# Patient Record
Sex: MALE | Race: BLACK OR AFRICAN AMERICAN | Employment: OTHER | URBAN - METROPOLITAN AREA
[De-identification: names, ages, dates, MRNs, and addresses within clinical notes are randomized per-mention and may not be internally consistent; named-entity substitution may affect disease eponyms.]

---

## 2018-02-06 ENCOUNTER — OFFICE VISIT (OUTPATIENT)
Dept: VASCULAR SURGERY | Age: 78
End: 2018-02-06

## 2018-02-06 VITALS
HEIGHT: 66 IN | SYSTOLIC BLOOD PRESSURE: 124 MMHG | WEIGHT: 180 LBS | DIASTOLIC BLOOD PRESSURE: 70 MMHG | RESPIRATION RATE: 18 BRPM | HEART RATE: 70 BPM | BODY MASS INDEX: 28.93 KG/M2

## 2018-02-06 DIAGNOSIS — M79.604 PAIN IN BOTH LOWER EXTREMITIES: ICD-10-CM

## 2018-02-06 DIAGNOSIS — R09.89 BRUIT OF LEFT CAROTID ARTERY: ICD-10-CM

## 2018-02-06 DIAGNOSIS — M79.605 PAIN IN BOTH LOWER EXTREMITIES: ICD-10-CM

## 2018-02-06 DIAGNOSIS — Z86.718 HISTORY OF DVT (DEEP VEIN THROMBOSIS): ICD-10-CM

## 2018-02-06 DIAGNOSIS — Z86.73 HISTORY OF CVA (CEREBROVASCULAR ACCIDENT): Primary | ICD-10-CM

## 2018-02-06 RX ORDER — GLUCOSAMINE SULFATE 1500 MG
POWDER IN PACKET (EA) ORAL DAILY
COMMUNITY
End: 2019-07-23

## 2018-02-06 RX ORDER — SPIRONOLACTONE 25 MG/1
TABLET ORAL DAILY
COMMUNITY
End: 2019-07-23

## 2018-02-06 RX ORDER — LOSARTAN POTASSIUM 100 MG/1
100 TABLET ORAL DAILY
COMMUNITY

## 2018-02-06 RX ORDER — CARVEDILOL 25 MG/1
25 TABLET ORAL 2 TIMES DAILY WITH MEALS
COMMUNITY
End: 2019-07-23

## 2018-02-06 RX ORDER — HYDROCODONE BITARTRATE AND ACETAMINOPHEN 5; 325 MG/1; MG/1
TABLET ORAL
COMMUNITY
End: 2019-07-23

## 2018-02-06 RX ORDER — ATORVASTATIN CALCIUM 20 MG/1
TABLET, FILM COATED ORAL DAILY
COMMUNITY

## 2018-02-06 RX ORDER — HYDROCHLOROTHIAZIDE 12.5 MG/1
12.5 CAPSULE ORAL DAILY
COMMUNITY

## 2018-02-06 NOTE — MR AVS SNAPSHOT
[facility] 
 
 
 [facility] [address] 
[phone] Patient: Frieda Vila MRN: [id number] Visit Information Date & Time Provider Department Dept. Phone Encounter #  
 2018 10:30 AM Adrian Austin MD [facility] [address] [id number] Your Appointments 2018 10:45 AM  
Follow Up with Adrian Austin MD  
BS Vein/Vascular Spec THE [facility] ([address]) Appt Note: 2 week FU with studies [facility] [address] [facility] [address] [facility] [address] [facility]  
  
   
 [facility]  Upcoming Health Maintenance Date Due DTaP/Tdap/Td series (1 - Tdap) 1961 ZOSTER VACCINE AGE 60> 1999 GLAUCOMA SCREENING Q2Y 2004 Pneumococcal 65+ Low/Medium Risk (1 of 2 - PCV13) 2004 MEDICARE YEARLY EXAM 2004 Influenza Age 5 to Adult 2017 Allergies as of 2018  Review Complete On: 2018 By: Ever Jones RN Severity Noted Reaction Type Reactions Fentanyl  2012    Unknown (comments) Penicillins  2012    Unknown (comments) Current Immunizations  Never Reviewed No immunizations on file. Not reviewed this visit You Were Diagnosed With   
  
 Codes Comments History of CVA (cerebrovascular accident)    -  Primary ICD-10-CM: Z86.73 
ICD-9-CM: V12.54 History of DVT (deep vein thrombosis)     ICD-10-CM: Y66.074 ICD-9-CM: V12.51 Bruit of left carotid artery     ICD-10-CM: R09.89 ICD-9-CM: 801. 9 Vitals BP Pulse Resp Height(growth percentile) Weight(growth percentile) BMI  
 124/70 (BP 1 Location: Left arm, BP Patient Position: Sitting) 70 18 5' 6\" (1.676 m) 180 lb (81.6 kg) 29.05 kg/m2 Smoking Status Former Smoker BMI and BSA Data Body Mass Index Body Surface Area 29.05 kg/m 2 1.95 m 2 Preferred Pharmacy Pharmacy Name Phone Harlan Vu [facility], [address]  [phone] Your Updated Medication List  
  
   
This list is accurate as of: 2/6/18 11:33 AM.  Always use your most recent med list.  
  
  
  
  
 ACIPHEX 20 mg tablet Generic drug:  RABEprazole Take 20 mg by mouth daily. aspirin 81 mg chewable tablet Take 81 mg by mouth daily. atorvastatin 20 mg tablet Commonly known as:  LIPITOR Take  by mouth daily. carvedilol 25 mg tablet Commonly known as:  Manus Tiera Take 25 mg by mouth two (2) times daily (with meals). DILANTIN EXTENDED 100 mg ER capsule Generic drug:  phenytoin ER Take  by mouth. hydroCHLOROthiazide 12.5 mg capsule Commonly known as:  Lorrie Shutters Take 12.5 mg by mouth daily. HYDROcodone-acetaminophen 5-325 mg per tablet Commonly known as:  Juan Fraction Take  by mouth.  
  
 lisinopril 40 mg tablet Commonly known as:  Martha Oakford Take 40 mg by mouth daily. losartan 100 mg tablet Commonly known as:  COZAAR Take 100 mg by mouth daily. Niaspan 1,000 mg Tb24 tab Generic drug:  niacin Take  by mouth nightly. spironolactone 25 mg tablet Commonly known as:  ALDACTONE Take  by mouth daily. TENORMIN 50 mg tablet Generic drug:  atenolol Take  by mouth daily. VITAMIN D3 1,000 unit Cap Generic drug:  cholecalciferol Take  by mouth daily. * warfarin 7.5 mg tablet Commonly known as:  COUMADIN Take 7.5 mg by mouth daily. * warfarin 5 mg tablet Commonly known as:  COUMADIN Take 5 mg by mouth daily. ZANTAC 150 mg tablet Generic drug:  raNITIdine Take 150 mg by mouth two (2) times a day. ZOCOR 40 mg tablet Generic drug:  simvastatin Take  by mouth nightly. * Notice: This list has 2 medication(s) that are the same as other medications prescribed for you.  Read the directions carefully, and ask your doctor or other care provider to review them with you. To-Do List   
 02/13/2018 9:00 AM  
  Appointment with THE [facility] at THE [facility] [address] [place] [address] ([phone]) No preparation is required for this study. Patient can have their meals and take their medications. Patient should not wear a turtleneck or high neck shirt for this study. Please arrive 30 minutes prior to your scheduled appointment time. 02/13/2018 10:00 AM  
  Appointment with THE [facility] VASC[facility] LAB at THE [facility] [address] [place] [address] ([phone]) Please have patient bring a copy of their order if same day add-on. It can also be faxed to [facility] - [phone]. There are no restrictions for this study. The patient can eat breakfast and take their medications. 02/21/2018 Imaging:  DUPLEX CAROTID BILATERAL   
  
 02/21/2018 Imaging:  DUPLEX UPPER EXT VENOUS BILAT Please provide this summary of care documentation to your next provider. Your primary care clinician is listed as Narciso Ahmadi. If you have any questions after today's visit, please call [phone].

## 2018-02-07 NOTE — PROGRESS NOTES
Malena Paul    Chief Complaint   Patient presents with    Leg Pain    Blood Clot       History and Physical    Mr. Romain Trejo is a 65year old male who was referred to our office for evaluation of his vascular disease and recent history of bilateral leg pain right worse than left. Mr. Romain Trejo has a history of a CVA several years prior which has left him with some residual right sided weakness and some expressive aphasia. During the time he was hospitalized for his stroke he developed an extensive bilateral DVT and had an IVC filter placed. He is currently on chronic anticoagulation. Recently, he has developed pain in his upper thighs and legs. He states that this pain is worsened by laying on his side at night and when he walks. Mr. Romain Trejo states that the pain he develops in his legs with walking occurs as soon as he starts walking and is not associated with distances. He denies any wounds or ulcers or excessive swelling. He is a former smoker. Past Medical History:   Diagnosis Date    Burning with urination     Elevated PSA 3/14/2012    Hypercholesterolemia     Hypertension     Peripheral vascular disease ([facility]     Stroke ([facility]     Thromboembolus [facility])      Patient Active Problem List   Diagnosis Code    Elevated PSA R97.20    History of CVA (cerebrovascular accident) Z80.78    History of DVT (deep vein thrombosis) Z86.718    Bruit of left carotid artery R09.89    Pain in both lower extremities M79.604, M79.605     Past Surgical History:   Procedure Laterality Date    HX COLONOSCOPY      HX PROSTATECTOMY      HX UROLOGICAL      IR STENT PLACEMENT  2002    VASCULAR SURGERY PROCEDURE UNLIST       Current Outpatient Prescriptions   Medication Sig Dispense Refill    HYDROcodone-acetaminophen (NORCO) 5-325 mg per tablet Take  by mouth.  atorvastatin (LIPITOR) 20 mg tablet Take  by mouth daily.  losartan (COZAAR) 100 mg tablet Take 100 mg by mouth daily.       carvedilol (COREG) 25 mg tablet Take 25 mg by mouth two (2) times daily (with meals).  cholecalciferol (VITAMIN D3) 1,000 unit cap Take  by mouth daily.  hydroCHLOROthiazide (MICROZIDE) 12.5 mg capsule Take 12.5 mg by mouth daily.  spironolactone (ALDACTONE) 25 mg tablet Take  by mouth daily.  warfarin (COUMADIN) 7.5 mg tablet Take 7.5 mg by mouth daily.  warfarin (COUMADIN) 5 mg tablet Take 5 mg by mouth daily.  aspirin 81 mg chewable tablet Take 81 mg by mouth daily.  phenytoin ER (DILANTIN EXTENDED) 100 mg ER capsule Take  by mouth.  RABEprazole (ACIPHEX) 20 mg tablet Take 20 mg by mouth daily.  ranitidine (ZANTAC) 150 mg tablet Take 150 mg by mouth two (2) times a day.  niacin (NIASPAN) 1,000 mg Tb24 tab Take  by mouth nightly.  simvastatin (ZOCOR) 40 mg tablet Take  by mouth nightly.  atenolol (TENORMIN) 50 mg tablet Take  by mouth daily.  lisinopril (PRINIVIL, ZESTRIL) 40 mg tablet Take 40 mg by mouth daily. Allergies   Allergen Reactions    Fentanyl Unknown (comments)    Penicillins Unknown (comments)     Social History     Social History    Marital status:      Spouse name: N/A    Number of children: N/A    Years of education: N/A     Occupational History    Not on file. Social History Main Topics    Smoking status: Former Smoker     Types: Cigarettes     Quit date: 2/6/1980    Smokeless tobacco: Never Used    Alcohol use No    Drug use: No    Sexual activity: Not Currently     Other Topics Concern    Not on file     Social History Narrative      History reviewed. No pertinent family history. Review of Systems    Review of Systems   Constitutional: Negative for chills, diaphoresis, fever, malaise/fatigue and weight loss. HENT: Negative for hearing loss and sore throat. Eyes: Negative for blurred vision, photophobia and redness. Respiratory: Negative for cough, hemoptysis, shortness of breath and wheezing. Cardiovascular: Positive for leg swelling. Negative for chest pain, palpitations and orthopnea. Gastrointestinal: Negative for abdominal pain, blood in stool, constipation, diarrhea, heartburn, nausea and vomiting. Genitourinary: Negative for dysuria, frequency, hematuria and urgency. Musculoskeletal: Negative for back pain and myalgias. Skin: Negative for itching and rash. Neurological: Positive for focal weakness. Negative for dizziness, speech change, weakness and headaches. Endo/Heme/Allergies: Does not bruise/bleed easily. Psychiatric/Behavioral: Negative for depression and suicidal ideas. Physical Exam:    Visit Vitals    /70 (BP 1 Location: Left arm, BP Patient Position: Sitting)    Pulse 70    Resp 18    Ht 5' 6\" (1.676 m)    Wt 180 lb (81.6 kg)    BMI 29.05 kg/m2      Physical Examination: General appearance - alert, well appearing, and in no distress  Mental status - alert, oriented to person, place, and time  Eyes - sclera anicteric, left eye normal, right eye normal  Ears - right ear normal, left ear normal  Nose - normal and patent, no erythema, discharge or polyps  Mouth - mucous membranes moist, pharynx normal without lesions  Neck - supple, no significant adenopathy, soft left carotid bruit  Lymphatics - no palpable lymphadenopathy  Chest - clear to auscultation, no wheezes, rales or rhonchi, symmetric air entry  Heart - normal rate and regular rhythm  Abdomen - soft, nontender, nondistended, no masses or organomegaly  Neurological - Contracture of right hand. Right upper and lower extremity 3/5 strength  Extremities - 1+ bilateral edema. + pedal pulses bilaterally      Impression and Plan:    ICD-10-CM ICD-9-CM    1. History of CVA (cerebrovascular accident) Z86.73 V12.54 DUPLEX CAROTID BILATERAL   2. History of DVT (deep vein thrombosis) Z86.718 V12.51 DUPLEX UPPER EXT VENOUS BILAT      DUPLEX LOWER EXT VENOUS BILAT   3.  Bruit of left carotid artery R09.89 785.9 DUPLEX CAROTID BILATERAL   4. Pain in both lower extremities M79.604 729.5 DUPLEX LOWER EXT VENOUS BILAT    M79.605       Orders Placed This Encounter    DUPLEX CAROTID BILATERAL    DUPLEX UPPER EXT VENOUS BILAT    DUPLEX LOWER EXT VENOUS BILAT    HYDROcodone-acetaminophen (NORCO) 5-325 mg per tablet    atorvastatin (LIPITOR) 20 mg tablet    losartan (COZAAR) 100 mg tablet    carvedilol (COREG) 25 mg tablet    cholecalciferol (VITAMIN D3) 1,000 unit cap    hydroCHLOROthiazide (MICROZIDE) 12.5 mg capsule    spironolactone (ALDACTONE) 25 mg tablet     I told Mr. Carolyn Baca that given his pulse exam as well as the description of his pain that his symptoms are not due to arterial disease. It is unlikely related to venous disease as well, but given his history of extensive DVTs I do believe a reflux study is warranted. Ideally Mr. Carolyn Baca would wear compression stockings to see if this can help with his symptoms, but I am not sure if he would be able to put them on with his residual RUE weakness. We will obtain a reflux study to see if he has significant reflux and will then address this compression issue. Finally, we will check his carotid arteries given his history of a stroke and carotid bruit. Follow-up Disposition:  Return for Vascular labs. The treatment plan was reviewed with the patient in detail. The patient voiced understanding of this plan and all questions and concerns were addressed. The patient agrees with this plan. We discussed the signs and symptoms that would require earlier attention or intervention. The patient was given educational material related to his/her visit and the patient has voiced understanding of the material.     I appreciate the opportunity to participate in the care of your patient. I will be sure to keep you informed of any subsequent changes in the treatment plan. If you have any questions or concerns, please feel free to contact me.   Vicenta Haynes, MD    PLEASE NOTE:  This document has been produced using voice recognition software. Unrecognized errors in transcription may be present.

## 2018-02-13 ENCOUNTER — HOSPITAL ENCOUNTER (OUTPATIENT)
Dept: VASCULAR SURGERY | Age: 78
Discharge: HOME | End: 2018-02-13
Attending: SURGERY
Payer: MEDICARE

## 2018-02-13 ENCOUNTER — APPOINTMENT (OUTPATIENT)
Dept: VASCULAR SURGERY | Age: 78
End: 2018-02-13
Attending: SURGERY
Payer: MEDICARE

## 2018-02-13 DIAGNOSIS — M79.605 PAIN IN BOTH LOWER EXTREMITIES: ICD-10-CM

## 2018-02-13 DIAGNOSIS — R09.89 BRUIT OF LEFT CAROTID ARTERY: ICD-10-CM

## 2018-02-13 DIAGNOSIS — M79.604 PAIN IN BOTH LOWER EXTREMITIES: ICD-10-CM

## 2018-02-13 DIAGNOSIS — Z86.718 HISTORY OF DVT (DEEP VEIN THROMBOSIS): ICD-10-CM

## 2018-02-13 DIAGNOSIS — Z86.73 HISTORY OF CVA (CEREBROVASCULAR ACCIDENT): ICD-10-CM

## 2018-02-13 PROCEDURE — 93880 EXTRACRANIAL BILAT STUDY: CPT

## 2018-02-13 PROCEDURE — 93970 EXTREMITY STUDY: CPT

## 2018-02-13 NOTE — PROCEDURES
[facility]  *** FINAL REPORT ***    Name: Yeny Chavez  MRN: [id number]    Outpatient  : 02 Sep 1940  HIS Order #: [id number]  [id number] [facility] Visit #: [id number]  Date: 2018    TYPE OF TEST: Peripheral Venous Testing    REASON FOR TEST  Limb swelling    Right Leg:-  Deep venous thrombosis:           No  Superficial venous thrombosis:    No  Deep venous insufficiency:        Yes  Superficial venous insufficiency: Yes    Left Leg:-  Deep venous thrombosis:           No  Superficial venous thrombosis:    No  Deep venous insufficiency:        Yes  Superficial venous insufficiency: Yes    Abnormal Valve Closure Times (seconds):    Right Common Femoral: 1.3    Right Femoral:        1.5    Right Deep Femoral:   2.0    Right Popliteal:      3.8    Right SFJ:            3.9    Right GSV proximal:   1.4    Right GSV mid:        1.5    Right GSV distal:     2.5    Left Common Femoral:  2.8    Left Femoral:         7.0    Left Deep Femoral:    6.5    Left Popliteal:       7.1    Left SFJ:             4.7    Left GSV proximal:    3.4    Left GSV mid:         2.1    Left SSV:             2.3    Vein Mapping:    Diam.   Depth  (mm)    Right Great Saphenous Vein:    High Thigh:      5.7    Mid Thigh:       5.2    Low Thigh:       5.0    Knee:            5.0    High Calf:       4.7    Low Calf:        4.8    Ankle:           4.0    Right Small Saphenous Vein:    SPJ:    Mid Calf: Ankle:    Giacomini:  Accessory saph.:  Daniel :  Marval Aures :    Left Great Saphenous Vein:    High Thigh:      4.8    Mid Thigh:       4.7    Low Thigh:       4.1    Knee:    High Calf:       5.1    Low Calf:        3.9    Ankle:    Left Small Saphenous Vein:    SPJ:             3.7    Mid Calf: Ankle:    Giacomini:  Accessory saph.:  Daniel :  Fields :      INTERPRETATION/FINDINGS  Duplex and B-mode images were obtained using 2-D gray scale, color  flow, and spectral Doppler analysis. Right leg :  1.  Deep vein(s) visualized include the common femoral, deep femoral,  proximal femoral, mid femoral, distal femoral, popliteal(above knee),  popliteal(fossa) and popliteal(below knee) veins. 2. No evidence of deep venous thrombosis detected in the veins  visualized. 3. Superficial vein(s) visualized include the great saphenous and  small saphenous veins. 4. No evidence of superficial thrombosis detected. 5. Incompetent deep venous system with reflux involving the common  femoral, deep femoral, proximal femoral, mid femoral, distal femoral,  popliteal(above knee) and popliteal(below knee) veins. 6. Incompetent great saphenous vein with reflux in the sapheno-femoral   junction, proximal thigh, mid thigh and distal thigh. 7. Saphenopopliteal junction not seen    Left leg :  1. Deep vein(s) visualized include the common femoral, deep femoral,  proximal femoral, mid femoral, distal femoral, popliteal(above knee),  popliteal(fossa) and popliteal(below knee) veins. 2. No evidence of deep venous thrombosis detected in the veins  visualized. 3. Superficial vein(s) visualized include the great saphenous and  small saphenous veins. 4. No evidence of superficial thrombosis detected. 5. Incompetent deep venous system with reflux involving the common  femoral, deep femoral, proximal femoral, mid femoral, distal femoral,  popliteal(above knee) and popliteal(below knee) veins. 6. Incompetent great saphenous vein with reflux in the sapheno-femoral   junction, proximal thigh and mid thigh. 7. Incompetent small saphenous vein with reflux in the  sapheno-popliteal junction and calf. 8. Mid calf = 2.8mm and reflux 7.9s    ADDITIONAL COMMENTS  Venous insufficiency worksheet is scanned in patients chart    I have personally reviewed the data relevant to the interpretation of  this  study. TECHNOLOGIST: Sima Gaytan  Signed: 02/13/2018 01:00 PM    PHYSICIAN: Amarjit Lima.  Sen Villafuerte MD  Signed: 02/13/2018 03:25 PM

## 2018-02-13 NOTE — PROCEDURES
[facility]  *** FINAL REPORT ***    Name: Shola Rivera  MRN: [id number]    Outpatient  : 02 Sep 1940  HIS Order #: [id number]  [address] [facility] Visit #: [id number]  Date: 2018    TYPE OF TEST: Cerebrovascular Duplex    REASON FOR TEST  Cerebrovascular accident    Right Carotid:-             Proximal               Mid                 Distal  cm/s  Systolic  Diastolic  Systolic  Diastolic  Systolic  Diastolic  CCA:     85.6      14.0      116.0      19.0       90.0      14.0  Bulb:  ECA:    155.0      17.0  ICA:     35.0       8.0       49.0      16.0       82.0      22.0  ICA/CCA:  0.7       1.2    ICA Stenosis: <50%    Right Vertebral:-  Finding: Antegrade  Sys:       40.0  Shala:        8.0    Right Subclavian: Normal    Left Carotid:-            Proximal                Mid                 Distal  cm/s  Systolic  Diastolic  Systolic  Diastolic  Systolic  Diastolic  CCA:    281.5      13.0      108.0      17.0       91.0      14.0  Bulb:  ECA:    103.0       6.0  ICA:     46.0      11.0       61.0      16.0       52.0      13.0  ICA/CCA:  0.4       1.2    ICA Stenosis: <50%    Left Vertebral:-  Finding: Antegrade  Sys:       40.0  Shala:        8.0    Left Subclavian: Normal    INTERPRETATION/FINDINGS  Duplex images were obtained using 2-D gray scale, color flow, and  spectral Doppler analysis. 1. Bilateral <50% stenosis of the internal carotid arteries. 2. No significant stenosis in the external carotid arteries  bilaterally. 3. Antegrade flow in both vertebral arteries. 4. Normal flow in both subclavian arteries. Plaque Morphology:  1. Homogeneous plaque in the bulb and right ICA. 2. Homogeneous plaque in the bulb and left ICA. ADDITIONAL COMMENTS    I have personally reviewed the data relevant to the interpretation of  this  study. TECHNOLOGIST: Lucius Shane  Signed: 2018 11:07 AM    PHYSICIAN: Loc Fall.  Barrie Fu MD  Signed: 2018 03:26 PM

## 2018-02-20 ENCOUNTER — OFFICE VISIT (OUTPATIENT)
Dept: VASCULAR SURGERY | Age: 78
End: 2018-02-20

## 2018-02-20 VITALS
BODY MASS INDEX: 28.93 KG/M2 | WEIGHT: 180 LBS | DIASTOLIC BLOOD PRESSURE: 80 MMHG | HEIGHT: 66 IN | HEART RATE: 68 BPM | RESPIRATION RATE: 18 BRPM | SYSTOLIC BLOOD PRESSURE: 142 MMHG

## 2018-02-20 DIAGNOSIS — M79.604 PAIN IN BOTH LOWER EXTREMITIES: Primary | ICD-10-CM

## 2018-02-20 DIAGNOSIS — M79.605 PAIN IN BOTH LOWER EXTREMITIES: Primary | ICD-10-CM

## 2018-02-20 DIAGNOSIS — M79.89 LEG SWELLING: ICD-10-CM

## 2018-02-20 NOTE — MR AVS SNAPSHOT
[facility] 
 
 
 [address] [place] [address] 
[phone] Patient: Valerie Marie MRN: [id number] ROSITA:[birth year] Visit Information Date & Time Provider Department Dept. Phone Encounter #  
 2/20/2018 10:45 AM CORY Escobedo Vein/Vascular Spec [address] [id number] Your Appointments 3/13/2018  8:15 AM  
HOSPITAL DISCHARGE with Jayla Cowan NP  
BS Vein/Vascular Spec THE [facility] ([facility] SCHEDULING) Appt Note: DC venogram  
 [facility] [address] [facility]  
  
   
 [address] [place] [address] Upcoming Health Maintenance Date Due DTaP/Tdap/Td series (1 - Tdap) 9/2/1960 ZOSTER VACCINE AGE 60> 7/2/1999 GLAUCOMA SCREENING Q2Y 9/2/2004 Pneumococcal 65+ Low/Medium Risk (1 of 2 - PCV13) 9/2/2004 MEDICARE YEARLY EXAM 9/2/2004 Allergies as of 2/20/2018  Review Complete On: 2/20/2018 By: Angeles Bosch RN Severity Noted Reaction Type Reactions Fentanyl  03/14/2012    Unknown (comments) Penicillins  03/14/2012    Unknown (comments) Current Immunizations  Never Reviewed No immunizations on file. Not reviewed this visit You Were Diagnosed With   
  
 Codes Comments Pain in both lower extremities    -  Primary ICD-10-CM: M79.604, M79.605 ICD-9-CM: 729.5 Leg swelling     ICD-10-CM: M79.89 ICD-9-CM: 729.81 Vitals BP Pulse Resp Height(growth percentile) Weight(growth percentile) BMI  
 142/80 (BP 1 Location: Left arm, BP Patient Position: Sitting) 68 18 5' 6\" (1.676 m) 180 lb (81.6 kg) 29.05 kg/m2 Smoking Status Former Smoker BMI and BSA Data Body Mass Index Body Surface Area 29.05 kg/m 2 1.95 m 2 Preferred Pharmacy Pharmacy Name Phone [address] [facility], [address] [place] [address] [phone] Your Updated Medication List  
  
 This list is accurate as of: 2/20/18  2:45 PM.  Always use your most recent med list.  
  
  
  
  
 ACIPHEX 20 mg tablet Generic drug:  RABEprazole Take 20 mg by mouth daily. aspirin 81 mg chewable tablet Take 81 mg by mouth daily. atorvastatin 20 mg tablet Commonly known as:  LIPITOR Take  by mouth daily. carvedilol 25 mg tablet Commonly known as:  Suyapa Hard Take 25 mg by mouth two (2) times daily (with meals). DILANTIN EXTENDED 100 mg ER capsule Generic drug:  phenytoin ER Take  by mouth. hydroCHLOROthiazide 12.5 mg capsule Commonly known as:  Dencassiece Sarks Take 12.5 mg by mouth daily. HYDROcodone-acetaminophen 5-325 mg per tablet Commonly known as:  Ferne Arrow Take  by mouth.  
  
 lisinopril 40 mg tablet Commonly known as:  Sakshi Freitas Take 40 mg by mouth daily. losartan 100 mg tablet Commonly known as:  COZAAR Take 100 mg by mouth daily. Niaspan 1,000 mg Tb24 tab Generic drug:  niacin Take  by mouth nightly. spironolactone 25 mg tablet Commonly known as:  ALDACTONE Take  by mouth daily. TENORMIN 50 mg tablet Generic drug:  atenolol Take  by mouth daily. VITAMIN D3 1,000 unit Cap Generic drug:  cholecalciferol Take  by mouth daily. * warfarin 7.5 mg tablet Commonly known as:  COUMADIN Take 7.5 mg by mouth daily. * warfarin 5 mg tablet Commonly known as:  COUMADIN Take 5 mg by mouth daily. ZANTAC 150 mg tablet Generic drug:  raNITIdine Take 150 mg by mouth two (2) times a day. ZOCOR 40 mg tablet Generic drug:  simvastatin Take  by mouth nightly. * Notice: This list has 2 medication(s) that are the same as other medications prescribed for you. Read the directions carefully, and ask your doctor or other care provider to review them with you. To-Do List   
 Around 03/22/2018 Lab:  MELLISSA W/O DIFF Around 03/22/2018 Lab: METABOLIC PANEL, BASIC Around 03/22/2018 Lab:  PROTHROMBIN TIME + INR Please provide this summary of care documentation to your next provider. Your primary care clinician is listed as Zaid Mcconnell. If you have any questions after today's visit, please call [phone].

## 2018-02-21 NOTE — PROGRESS NOTES
Mike Beckwith    Chief Complaint   Patient presents with    Leg Pain       History and Physical    Mr. Berry Wiley is a 65year old male who returns to our office for continued management of his bilateral leg pain. He does not wear compression stockings because they hurt his skin. He also reports chronic right lower abdominal pain, which has been present for \"a long time\". This pain is worsened by laying on his right side and improves when he lays on his left side. He has been worked up by GI for this with no resolution. Past Medical History:   Diagnosis Date    Burning with urination     Elevated PSA 3/14/2012    Hypercholesterolemia     Hypertension     Peripheral vascular disease ([address]     Stroke ([address]     Thromboembolus [facility])      Patient Active Problem List   Diagnosis Code    Elevated PSA R97.20    History of CVA (cerebrovascular accident) Z80.78    History of DVT (deep vein thrombosis) Z86.718    Bruit of left carotid artery R09.89    Pain in both lower extremities M79.604, M79.605     Past Surgical History:   Procedure Laterality Date    HX COLONOSCOPY      HX PROSTATECTOMY      HX UROLOGICAL      IR STENT PLACEMENT  2002    VASCULAR SURGERY PROCEDURE UNLIST       Current Outpatient Prescriptions   Medication Sig Dispense Refill    HYDROcodone-acetaminophen (NORCO) 5-325 mg per tablet Take  by mouth.  atorvastatin (LIPITOR) 20 mg tablet Take  by mouth daily.  losartan (COZAAR) 100 mg tablet Take 100 mg by mouth daily.  carvedilol (COREG) 25 mg tablet Take 25 mg by mouth two (2) times daily (with meals).  cholecalciferol (VITAMIN D3) 1,000 unit cap Take  by mouth daily.  hydroCHLOROthiazide (MICROZIDE) 12.5 mg capsule Take 12.5 mg by mouth daily.  spironolactone (ALDACTONE) 25 mg tablet Take  by mouth daily.  RABEprazole (ACIPHEX) 20 mg tablet Take 20 mg by mouth daily.  ranitidine (ZANTAC) 150 mg tablet Take 150 mg by mouth two (2) times a day.  warfarin (COUMADIN) 7.5 mg tablet Take 7.5 mg by mouth daily.  warfarin (COUMADIN) 5 mg tablet Take 5 mg by mouth daily.  niacin (NIASPAN) 1,000 mg Tb24 tab Take  by mouth nightly.  aspirin 81 mg chewable tablet Take 81 mg by mouth daily.  simvastatin (ZOCOR) 40 mg tablet Take  by mouth nightly.  atenolol (TENORMIN) 50 mg tablet Take  by mouth daily.  phenytoin ER (DILANTIN EXTENDED) 100 mg ER capsule Take  by mouth.  lisinopril (PRINIVIL, ZESTRIL) 40 mg tablet Take 40 mg by mouth daily. Allergies   Allergen Reactions    Fentanyl Unknown (comments)    Penicillins Unknown (comments)     Social History     Social History    Marital status:      Spouse name: N/A    Number of children: N/A    Years of education: N/A     Occupational History    Not on file. Social History Main Topics    Smoking status: Former Smoker     Types: Cigarettes     Quit date: 2/6/1980    Smokeless tobacco: Never Used    Alcohol use No    Drug use: No    Sexual activity: Not Currently     Other Topics Concern    Not on file     Social History Narrative      History reviewed. No pertinent family history. Review of Systems    Review of Systems   Constitutional: Negative for chills, fever, malaise/fatigue and weight loss. HENT: Negative for ear pain and hearing loss. Eyes: Negative for blurred vision, pain and discharge. Respiratory: Negative for cough, hemoptysis, sputum production and shortness of breath. Cardiovascular: Positive for leg swelling. Negative for chest pain, palpitations and orthopnea. Gastrointestinal: Positive for abdominal pain and heartburn. Negative for nausea and vomiting. Genitourinary: Negative for dysuria and urgency. Musculoskeletal: Positive for joint pain and myalgias. Negative for falls. Skin: Negative for itching and rash. Neurological: Negative for dizziness, tingling, weakness and headaches. Endo/Heme/Allergies: Does not bruise/bleed easily. Psychiatric/Behavioral: Negative for depression. Physical Exam:    Visit Vitals    /80 (BP 1 Location: Left arm, BP Patient Position: Sitting)    Pulse 68    Resp 18    Ht 5' 6\" (1.676 m)    Wt 180 lb (81.6 kg)    BMI 29.05 kg/m2      Physical Examination: General appearance - alert, well appearing, and in no distress and chronically ill appearing  Mental status - alert, oriented to person, place, and time, normal mood, behavior, speech, dress, motor activity, and thought processes  Eyes - left eye normal, right eye normal  Ears - right ear normal, left ear normal  Mouth - mucous membranes moist  Chest - clear to auscultation, no wheezes  Heart - normal rate and regular rhythm  Abdomen - soft, round, tender to palpation, nondistended  Extremities - warm and well-perfused, palpable pedal pulses, 1+ edema bilaterally  Skin - normal coloration and turgor, no rashes, no suspicious skin lesions noted      Impression and Plan:    ICD-10-CM ICD-9-CM    1. Pain in both lower extremities M79.604 729.5 CBC W/O DIFF    L75.420  METABOLIC PANEL, BASIC      PROTHROMBIN TIME + INR   2. Leg swelling M79.89 729.81      Orders Placed This Encounter    CBC W/O DIFF    METABOLIC PANEL, BASIC    PROTHROMBIN TIME + INR     I had a discussion with Mr. Marcin Morris and his son regarding his recent imaging studies. His carotid duplex shows > 50% narrowing bilaterally, we will follow this with annual carotid duplexes. 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 I recommend he try to elevate his legs when possible and wear compression, if tolerated, to help with the swelling. I also recommended a venogram to look for a proximal stenosis that could be contributing to his symptoms. I explained the risks and benefits of the procedure and Mr. Marcin Morris would like to proceed.   We will arrange for venogram as soon as feasible. Follow-up Disposition:  Return in about 3 weeks (around 3/13/2018). The treatment plan was reviewed with the patient in detail. The patient voiced understanding of this plan and all questions and concerns were addressed. The patient agrees with this plan. We discussed the signs and symptoms that would require earlier attention or intervention. The patient was given educational material related to his/her visit and the patient has voiced understanding of the material.     I appreciate the opportunity to participate in the care of your patient. I will be sure to keep you informed of any subsequent changes in the treatment plan. If you have any questions or concerns, please feel free to contact me. Erin Baxter NP    PLEASE NOTE:  This document has been produced using voice recognition software. Unrecognized errors in transcription may be present.

## 2018-02-21 NOTE — PROGRESS NOTES
PT aware of NPO status. PT aware of need to hold anticoagulants per protocol. PT aware of potential for sedation administration and need for  at discharge. PT aware of arrival time pre procedure. Pt states no questions at this time. Pt very confused and speech difficult to understand. Unable to verify medications -has stopped his coumadin.

## 2018-02-22 ENCOUNTER — HOSPITAL ENCOUNTER (OUTPATIENT)
Dept: PREADMISSION TESTING | Age: 78
Discharge: HOME | End: 2018-02-22
Payer: MEDICARE

## 2018-02-22 ENCOUNTER — HOSPITAL ENCOUNTER (OUTPATIENT)
Dept: LAB | Age: 78
End: 2018-02-22
Payer: MEDICARE

## 2018-02-22 LAB
APTT PPP: 31.6 SEC (ref 23–36.4)
INR PPP: 2 (ref 0.8–1.2)
PLATELET # BLD AUTO: 253 K/UL (ref 135–420)
PROTHROMBIN TIME: 21.6 SEC (ref 11.5–15.2)

## 2018-02-22 PROCEDURE — 85610 PROTHROMBIN TIME: CPT | Performed by: SURGERY

## 2018-02-22 PROCEDURE — 36415 COLL VENOUS BLD VENIPUNCTURE: CPT | Performed by: SURGERY

## 2018-02-22 PROCEDURE — 85730 THROMBOPLASTIN TIME PARTIAL: CPT | Performed by: SURGERY

## 2018-02-22 PROCEDURE — 85049 AUTOMATED PLATELET COUNT: CPT | Performed by: SURGERY

## 2018-02-23 ENCOUNTER — TELEPHONE (OUTPATIENT)
Dept: VASCULAR SURGERY | Age: 78
End: 2018-02-23

## 2018-02-23 NOTE — TELEPHONE ENCOUNTER
Called the patient and advised to arrive at 0630 am , NPO after midnight , and to take heart and BP meds with small sip of water in the am. Patient has been off of his coumadin since 02/20/18. Pt verbalized understanding. Called and left message with son with same instructions.

## 2018-02-26 ENCOUNTER — HOSPITAL ENCOUNTER (OUTPATIENT)
Dept: INTERVENTIONAL RADIOLOGY/VASCULAR | Age: 78
Discharge: HOME | End: 2018-02-26
Attending: SURGERY | Admitting: SURGERY
Payer: MEDICARE

## 2018-02-26 VITALS
RESPIRATION RATE: 16 BRPM | OXYGEN SATURATION: 97 % | BODY MASS INDEX: 29.98 KG/M2 | WEIGHT: 186.56 LBS | DIASTOLIC BLOOD PRESSURE: 78 MMHG | HEART RATE: 63 BPM | SYSTOLIC BLOOD PRESSURE: 152 MMHG | TEMPERATURE: 97.8 F | HEIGHT: 66 IN

## 2018-02-26 DIAGNOSIS — I87.2 VENOUS INSUFFICIENCY: ICD-10-CM

## 2018-02-26 LAB
ANION GAP SERPL CALC-SCNC: 10 MMOL/L (ref 3–18)
BASOPHILS # BLD: 0 K/UL (ref 0–0.06)
BASOPHILS NFR BLD: 1 % (ref 0–2)
BUN SERPL-MCNC: 14 MG/DL (ref 7–18)
BUN/CREAT SERPL: 10 (ref 12–20)
CALCIUM SERPL-MCNC: 9.1 MG/DL (ref 8.5–10.1)
CHLORIDE SERPL-SCNC: 106 MMOL/L (ref 100–108)
CO2 SERPL-SCNC: 26 MMOL/L (ref 21–32)
CREAT SERPL-MCNC: 1.41 MG/DL (ref 0.6–1.3)
DIFFERENTIAL METHOD BLD: ABNORMAL
EOSINOPHIL # BLD: 0.3 K/UL (ref 0–0.4)
EOSINOPHIL NFR BLD: 6 % (ref 0–5)
ERYTHROCYTE [DISTWIDTH] IN BLOOD BY AUTOMATED COUNT: 15 % (ref 11.6–14.5)
GLUCOSE SERPL-MCNC: 105 MG/DL (ref 74–99)
HCT VFR BLD AUTO: 43 % (ref 36–48)
HGB BLD-MCNC: 14.6 G/DL (ref 13–16)
INR PPP: 1.2 (ref 0.8–1.2)
LYMPHOCYTES # BLD: 1.3 K/UL (ref 0.9–3.6)
LYMPHOCYTES NFR BLD: 24 % (ref 21–52)
MCH RBC QN AUTO: 26 PG (ref 24–34)
MCHC RBC AUTO-ENTMCNC: 34 G/DL (ref 31–37)
MCV RBC AUTO: 76.6 FL (ref 74–97)
MONOCYTES # BLD: 0.7 K/UL (ref 0.05–1.2)
MONOCYTES NFR BLD: 14 % (ref 3–10)
NEUTS SEG # BLD: 2.8 K/UL (ref 1.8–8)
NEUTS SEG NFR BLD: 55 % (ref 40–73)
PLATELET # BLD AUTO: 254 K/UL (ref 135–420)
PMV BLD AUTO: 9.8 FL (ref 9.2–11.8)
POTASSIUM SERPL-SCNC: 3.8 MMOL/L (ref 3.5–5.5)
PROTHROMBIN TIME: 15 SEC (ref 11.5–15.2)
RBC # BLD AUTO: 5.61 M/UL (ref 4.7–5.5)
SODIUM SERPL-SCNC: 142 MMOL/L (ref 136–145)
WBC # BLD AUTO: 5.2 K/UL (ref 4.6–13.2)

## 2018-02-26 PROCEDURE — 74011636320 HC RX REV CODE- 636/320: Performed by: SURGERY

## 2018-02-26 PROCEDURE — 80048 BASIC METABOLIC PNL TOTAL CA: CPT | Performed by: SURGERY

## 2018-02-26 PROCEDURE — 74011250636 HC RX REV CODE- 250/636: Performed by: SURGERY

## 2018-02-26 PROCEDURE — 85610 PROTHROMBIN TIME: CPT | Performed by: SURGERY

## 2018-02-26 PROCEDURE — 77030013687 IR VENOGRAPHY EXT BI

## 2018-02-26 PROCEDURE — 36415 COLL VENOUS BLD VENIPUNCTURE: CPT | Performed by: SURGERY

## 2018-02-26 PROCEDURE — 77030013687 IR VENOGRAPHY EXT RT

## 2018-02-26 PROCEDURE — 74011000250 HC RX REV CODE- 250: Performed by: SURGERY

## 2018-02-26 PROCEDURE — 99152 MOD SED SAME PHYS/QHP 5/>YRS: CPT

## 2018-02-26 PROCEDURE — 85025 COMPLETE CBC W/AUTO DIFF WBC: CPT | Performed by: SURGERY

## 2018-02-26 PROCEDURE — 74011250636 HC RX REV CODE- 250/636

## 2018-02-26 PROCEDURE — 99153 MOD SED SAME PHYS/QHP EA: CPT

## 2018-02-26 RX ORDER — SODIUM CHLORIDE 9 MG/ML
25 INJECTION, SOLUTION INTRAVENOUS CONTINUOUS
Status: DISCONTINUED | OUTPATIENT
Start: 2018-02-26 | End: 2018-02-26 | Stop reason: HOSPADM

## 2018-02-26 RX ORDER — NALOXONE HYDROCHLORIDE 0.4 MG/ML
0.2 INJECTION, SOLUTION INTRAMUSCULAR; INTRAVENOUS; SUBCUTANEOUS AS NEEDED
Status: DISCONTINUED | OUTPATIENT
Start: 2018-02-26 | End: 2018-02-26 | Stop reason: HOSPADM

## 2018-02-26 RX ORDER — HEPARIN SODIUM 1000 [USP'U]/ML
INJECTION, SOLUTION INTRAVENOUS; SUBCUTANEOUS
Status: COMPLETED
Start: 2018-02-26 | End: 2018-02-26

## 2018-02-26 RX ORDER — HEPARIN SODIUM 200 [USP'U]/100ML
500 INJECTION, SOLUTION INTRAVENOUS
Status: COMPLETED | OUTPATIENT
Start: 2018-02-26 | End: 2018-02-26

## 2018-02-26 RX ORDER — FLUMAZENIL 0.1 MG/ML
0.2 INJECTION INTRAVENOUS
Status: DISCONTINUED | OUTPATIENT
Start: 2018-02-26 | End: 2018-02-26 | Stop reason: HOSPADM

## 2018-02-26 RX ORDER — FENTANYL CITRATE 50 UG/ML
INJECTION, SOLUTION INTRAMUSCULAR; INTRAVENOUS
Status: DISCONTINUED
Start: 2018-02-26 | End: 2018-02-26 | Stop reason: HOSPADM

## 2018-02-26 RX ORDER — MIDAZOLAM HYDROCHLORIDE 1 MG/ML
.5-4 INJECTION, SOLUTION INTRAMUSCULAR; INTRAVENOUS
Status: DISCONTINUED | OUTPATIENT
Start: 2018-02-26 | End: 2018-02-26 | Stop reason: HOSPADM

## 2018-02-26 RX ORDER — DIPHENHYDRAMINE HYDROCHLORIDE 50 MG/ML
INJECTION, SOLUTION INTRAMUSCULAR; INTRAVENOUS
Status: DISCONTINUED
Start: 2018-02-26 | End: 2018-02-26 | Stop reason: HOSPADM

## 2018-02-26 RX ORDER — LIDOCAINE HYDROCHLORIDE 10 MG/ML
1-20 INJECTION INFILTRATION; PERINEURAL
Status: COMPLETED | OUTPATIENT
Start: 2018-02-26 | End: 2018-02-26

## 2018-02-26 RX ORDER — FENTANYL CITRATE 50 UG/ML
25-200 INJECTION, SOLUTION INTRAMUSCULAR; INTRAVENOUS
Status: DISCONTINUED | OUTPATIENT
Start: 2018-02-26 | End: 2018-02-26 | Stop reason: HOSPADM

## 2018-02-26 RX ORDER — DIPHENHYDRAMINE HYDROCHLORIDE 50 MG/ML
25-50 INJECTION, SOLUTION INTRAMUSCULAR; INTRAVENOUS ONCE
Status: COMPLETED | OUTPATIENT
Start: 2018-02-26 | End: 2018-02-26

## 2018-02-26 RX ADMIN — LIDOCAINE HYDROCHLORIDE 6 ML: 10 INJECTION, SOLUTION INFILTRATION; PERINEURAL at 08:50

## 2018-02-26 RX ADMIN — HEPARIN SODIUM 4000 UNITS: 1000 INJECTION, SOLUTION INTRAVENOUS; SUBCUTANEOUS at 09:05

## 2018-02-26 RX ADMIN — IOPAMIDOL 15 ML: 612 INJECTION, SOLUTION INTRAVENOUS at 09:00

## 2018-02-26 RX ADMIN — FENTANYL CITRATE 25 MCG: 50 INJECTION, SOLUTION INTRAMUSCULAR; INTRAVENOUS at 08:48

## 2018-02-26 RX ADMIN — MIDAZOLAM HYDROCHLORIDE 1 MG: 1 INJECTION, SOLUTION INTRAMUSCULAR; INTRAVENOUS at 08:48

## 2018-02-26 RX ADMIN — HEPARIN SODIUM 1000 UNITS: 200 INJECTION, SOLUTION INTRAVENOUS at 08:48

## 2018-02-26 RX ADMIN — DIPHENHYDRAMINE HYDROCHLORIDE 50 MG: 50 INJECTION, SOLUTION INTRAMUSCULAR; INTRAVENOUS at 08:42

## 2018-02-26 RX ADMIN — SODIUM CHLORIDE 25 ML/HR: 900 INJECTION, SOLUTION INTRAVENOUS at 07:13

## 2018-02-26 NOTE — INTERVAL H&P NOTE
H&P Update:  Homa Ruiz was seen and examined. History and physical has been reviewed. The patient has been examined.  There have been no significant clinical changes since the completion of the originally dated History and Physical.    Signed By: Huong Truong MD     February 26, 2018 9:37 AM

## 2018-02-26 NOTE — OP NOTES
[facility]  OPERATIVE REPORT    Jenni Smith  MR#: [id number]  : 1940  ACCOUNT #: [de-identified]   DATE OF SERVICE: 2018    PROCEDURES PERFORMED  1. Ultrasound-guided percutaneous access of right greater saphenous vein. 2.  Nonselective catheterization of the inferior vena cava. 3.  Venacavogram.  4.  Intravascular ultrasound of infrarenal inferior vena cava, right common and external iliac veins and femoral veins. SURGEON:  Cain Groves MD.     ANESTHESIA:  Local.      PACKS AND DRAINS:  None. IMPLANTS:  None. SPECIMENS REMOVED:  None. COMPLICATIONS:  None. CONDITION TO RECOVERY:  Stable. PREOPERATIVE DIAGNOSIS:  Bilateral leg swelling with pain. 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 ESTIMATED BLOOD LOSS: Minimal.     ASSISTANT: None. 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 INDICATION FOR PROCEDURE:  The patient is a 76-year-old gentleman who presented to our office with a history of bilateral leg discomfort and swelling. The patient has a previous history of a CVA with right-sided weakness. The patient also has a history of extensive DVTs during his time in  and had a filter placed at that time. The patient underwent noninvasive vascular lab study that showed significant deep venous reflux bilaterally and final decision was made to bring him to the catheterization suite for venogram, possible intervention. Informed consent is obtained.     PROCEDURE IN DETAIL:  On 2018, the patient presented to the catheterization suite, identified by name and ID bracelet by myself and the entire operative team. Once this was done, the patient placed on catheterization table in the supine position. After appropriate depth of anesthesia obtained, the patient was prepped and draped and timeout was performed. At this point, using ultrasound, the right greater saphenous vein was interrogated, it was compressible and patent and appeared appropriate site for access. Ultrasound guided percutaneous access of the right greater saphenous vein was then obtained. An access wire followed by an 8 Western Roxy sheath were then advanced over the wire into the femoral vein and then a catheter was then advanced up into the inferior vena cava followed by a wire. A venacavogram was obtained, which showed multiple venous collaterals just distal to the inferior vena cava with flow beyond the vena cava via collaterals, that refilled the IVC and a final decision was made to attempt to treat and so an IVUS catheter was then advanced over the Barr wire, but we could not pass beyond the inferior vena cava filter. We then performed a venacavogram with the above findings. Given these findings, the final decision was made not to treat at this time, as the patient most likely needed filter removed and we are not capable of doing it at this time. So, the sheath and wire and catheter were removed. Manual pressure held, which achieved hemostasis. I was present and scrubbed for the entire procedure.       MD Irineo Del Rosario / Fantasma Lanza  D: 02/26/2018 09:37     T: 02/26/2018 10:10  JOB #: [id number]

## 2018-02-26 NOTE — IP AVS SNAPSHOT
[address] [facility] [address] 
 
 
 [address] [facility] [address] 
[phone] Patient: Nancie Ag MRN: [id number] MOB:[birth year] About your hospitalization You were admitted on:  February 26, 2018 You last received care in the:  [address] You were discharged on:  February 26, 2018 Why you were hospitalized Your primary diagnosis was:  Not on File Follow-up Information Follow up With Details Comments Contact Info Wing Princess MD On 2/26/2018 Foolow up as scheduled [address] [facility] [address] [facility] 
[phone] Dahiana Bunch MD   [address] [facility] [address] [place] [address] 
[phone] Your Scheduled Appointments Tuesday March 13, 2018  8:15 AM EDT HOSPITAL DISCHARGE with Phoenix Kirk NP  
[facility]  
 [address] [facility] [address] [facility]  
[phone] Discharge Orders None A check arturo indicates which time of day the medication should be taken. My Medications CONTINUE taking these medications Instructions Each Dose to Equal  
 Morning Noon Evening Bedtime ACIPHEX 20 mg tablet Generic drug:  RABEprazole Your last dose was: Your next dose is: Take 20 mg by mouth daily. 20 mg  
    
   
   
   
  
 aspirin 81 mg chewable tablet Your last dose was: Your next dose is: Take 81 mg by mouth daily. 81 mg  
    
   
   
   
  
 atorvastatin 20 mg tablet Commonly known as:  LIPITOR Your last dose was: Your next dose is: Take  by mouth daily. carvedilol 25 mg tablet Commonly known as:  Julieta Sham Your last dose was: Your next dose is: Take 25 mg by mouth two (2) times daily (with meals). 25 mg  
    
   
   
   
  
 DILANTIN EXTENDED 100 mg ER capsule Generic drug:  phenytoin ER Your last dose was: Your next dose is: Take  by mouth. hydroCHLOROthiazide 12.5 mg capsule Commonly known as:  Yogi Obrien Your last dose was: Your next dose is: Take 12.5 mg by mouth daily. 12.5 mg  
    
   
   
   
  
 HYDROcodone-acetaminophen 5-325 mg per tablet Commonly known as:  [address] Your last dose was: Your next dose is: Take  by mouth.  
     
   
   
   
  
 lisinopril 40 mg tablet Commonly known as:  Lea Fair Your last dose was: Your next dose is: Take 40 mg by mouth daily. 40 mg  
    
   
   
   
  
 losartan 100 mg tablet Commonly known as:  COZAAR Your last dose was: Your next dose is: Take 100 mg by mouth daily. 100 mg Niaspan 1,000 mg Tb24 tab Generic drug:  niacin Your last dose was: Your next dose is: Take  by mouth nightly. spironolactone 25 mg tablet Commonly known as:  ALDACTONE Your last dose was: Your next dose is: Take  by mouth daily. TENORMIN 50 mg tablet Generic drug:  atenolol Your last dose was: Your next dose is: Take  by mouth daily. VITAMIN D3 1,000 unit Cap Generic drug:  cholecalciferol Your last dose was: Your next dose is: Take  by mouth daily. * warfarin 7.5 mg tablet Commonly known as:  COUMADIN Your last dose was: Your next dose is: Take 7.5 mg by mouth daily. 7.5 mg  
    
   
   
   
  
 * warfarin 5 mg tablet Commonly known as:  COUMADIN Your last dose was: Your next dose is: Take 5 mg by mouth daily. 5 mg ZANTAC 150 mg tablet Generic drug:  raNITIdine Your last dose was: Your next dose is: Take 150 mg by mouth two (2) times a day. 150 mg  
    
   
   
   
  
 ZOCOR 40 mg tablet Generic drug:  simvastatin Your last dose was: Your next dose is: Take  by mouth nightly. * Notice: This list has 2 medication(s) that are the same as other medications prescribed for you. Read the directions carefully, and ask your doctor or other care provider to review them with you. Discharge Instructions Angiogram: What to Expect at [facility] Your Recovery An angiogram is an X-ray test that uses dye and a camera to take pictures of the blood flow in an artery or a vein. The doctor inserted a thin, flexible tube (catheter) into a blood vessel in your groin. In some cases, the catheter is placed in a blood vessel in the arm. An angiogram is done for many reasons. For example, you may have an angiogram to find the source of bleeding, such as an ulcer. Or it may be done to look for blocked blood vessels in your lungs. After an angiogram, your groin or arm may have a bruise and feel sore for a day or two. You can do light activities around the house but nothing strenuous for several days. Your doctor may give you specific instructions on when you can do your normal activities again, such as driving and going back to work. This care sheet gives you a general idea about how long it will take for you to recover. But each person recovers at a different pace. Follow the steps below to feel better as quickly as possible. How can you care for yourself at home? Activity ? · Do not do strenuous exercise and do not lift, pull, or push anything heavy until your doctor says it is okay. This may be for a day or two. You can walk around the house and do light activity, such as cooking.   
? · You may shower 24 to 48 hours after the procedure, if your doctor okays it. Pat the incision dry. Do not take a bath for 1 week, or until your doctor tells you it is okay. ? · If the catheter was placed in your groin, try not to walk up stairs for the first couple of days. ? · If the catheter was placed in your arm near your wrist, do not bend your wrist deeply for the first couple of days. Be careful using your hand to get into and out of a chair or bed. ? · If your doctor recommends it, get more exercise. Walking is a good choice. Bit by bit, increase the amount you walk every day. Try for at least 30 minutes on most days of the week. Diet ? · Drink plenty of fluids to help your body flush out the dye. If you have kidney, heart, or liver disease and have to limit fluids, talk with your doctor before you increase the amount of fluids you drink. ? · You can eat your normal diet. If your stomach is upset, try bland, low-fat foods like plain rice, broiled chicken, toast, and yogurt. Medicines ? · Be safe with medicines. Read and follow all instructions on the label. ¨ If the doctor gave you a prescription medicine for pain, take it as prescribed. ¨ If you are not taking a prescription pain medicine, ask your doctor if you can take an over-the-counter medicine. ? · If you take blood thinners, such as warfarin (Coumadin), clopidogrel (Plavix), or aspirin, be sure to talk to your doctor. He or she will tell you if and when to start taking those medicines again. Make sure that you understand exactly what your doctor wants you to do.  
? · Your doctor will tell you if and when you can restart your medicines. He or she will also give you instructions about taking any new medicines. ?Care of the catheter site ? · You will have a dressing over the cut (incision). A dressing helps the incision heal and protects it. Your doctor will tell you how to take care of this.   
? · Put ice or a cold pack on the area for 10 to 20 minutes at a time to help with soreness or swelling. Put a thin cloth between the ice and your skin. Follow-up care is a key part of your treatment and safety. Be sure to make and go to all appointments, and call your doctor if you are having problems. It's also a good idea to know your test results and keep a list of the medicines you take. When should you call for help? Call 911 anytime you think you may need emergency care. For example, call if: 
? · You passed out (lost consciousness). ? · You have severe trouble breathing. ? · You have sudden chest pain and shortness of breath, or you cough up blood. ?Call your doctor now or seek immediate medical care if: 
? · You are bleeding from the area where the catheter was put in your artery. ? · You have a fast-growing, painful lump at the catheter site. ? · You have signs of infection, such as: 
¨ Increased pain, swelling, warmth, or redness. ¨ Red streaks leading from the incision. ¨ Pus draining from the incision. ¨ A fever. ? Watch closely for any changes in your health, and be sure to contact your doctor if: 
? · You don't get better as expected. Where can you learn more? Go to http://gladys-calvin.info/. Enter H704 in the search box to learn more about \"Angiogram: What to Expect at Home. \" Current as of: October 14, 2016 Content Version: 11.4 © 9947-4608 [employer]. Care instructions adapted under license by [employer] (which disclaims liability or warranty for this information). If you have questions about a medical condition or this instruction, always ask your healthcare professional. [place] [address] any warranty or liability for your use of this information. DISCHARGE SUMMARY from Nurse PATIENT INSTRUCTIONS: 
 
 
F-face looks uneven A-arms unable to move or move unevenly S-speech slurred or non-existent T-time-call 911 as soon as signs and symptoms begin-DO NOT go Back to bed or wait to see if you get better-TIME IS BRAIN. Warning Signs of HEART ATTACK Call 911 if you have these symptoms: 
? Chest discomfort. Most heart attacks involve discomfort in the center of the chest that lasts more than a few minutes, or that goes away and comes back. It can feel like uncomfortable pressure, squeezing, fullness, or pain. ? Discomfort in other areas of the upper body. Symptoms can include pain or discomfort in one or both arms, the back, neck, jaw, or stomach. ? Shortness of breath with or without chest discomfort. ? Other signs may include breaking out in a cold sweat, nausea, or lightheadedness. Don't wait more than five minutes to call [address]! Fast action can save your life. Calling 911 is almost always the fastest way to get lifesaving treatment. Emergency Medical Services staff can begin treatment when they arrive  up to an hour sooner than if someone gets to the hospital by car. The discharge information has been reviewed with the patient and caregiver. The patient and caregiver verbalized understanding. Discharge medications reviewed with the patient and caregiver and appropriate educational materials and side effects teaching were provided. Patient armband removed and shredded 
 
___________________________________________________________________________________________________________________________________ Unresulted Labs-Please follow up with your PCP about these lab tests Order Current Status IR VENOGRAPHY EXT RT In process Providers Seen During Your Hospitalization Provider Specialty Primary office phone Marissa Sandoval MD Vascular Surgery [phone] Your Primary Care Physician (PCP) Primary Care Physician Office Phone Office Fax Amanda Stokes [phone] You are allergic to the following Allergen Reactions Fentanyl Unknown (comments) Penicillins Unknown (comments) Recent Documentation Height Weight BMI Smoking Status 1.676 m 84.6 kg 30.11 kg/m2 Former Smoker Emergency Contacts Name Discharge Info Relation Home Work Mobile Bijan Song DISCHARGE CAREGIVER [3] Son [22]   [phone] Patient Belongings The following personal items are in your possession at time of discharge: 
     Visual Aid: Glasses, At home Please provide this summary of care documentation to your next provider. Signatures-by signing, you are acknowledging that this After Visit Summary has been reviewed with you and you have received a copy. Patient Signature:  ____________________________________________________________ Date:  ____________________________________________________________  
  
Sienna Patrick Provider Signature:  ____________________________________________________________ Date:  ____________________________________________________________

## 2018-02-26 NOTE — DISCHARGE INSTRUCTIONS
Angiogram: What to Expect at Home  Your Recovery  An angiogram is an X-ray test that uses dye and a camera to take pictures of the blood flow in an artery or a vein. The doctor inserted a thin, flexible tube (catheter) into a blood vessel in your groin. In some cases, the catheter is placed in a blood vessel in the arm. An angiogram is done for many reasons. For example, you may have an angiogram to find the source of bleeding, such as an ulcer. Or it may be done to look for blocked blood vessels in your lungs. After an angiogram, your groin or arm may have a bruise and feel sore for a day or two. You can do light activities around the house but nothing strenuous for several days. Your doctor may give you specific instructions on when you can do your normal activities again, such as driving and going back to work. This care sheet gives you a general idea about how long it will take for you to recover. But each person recovers at a different pace. Follow the steps below to feel better as quickly as possible. How can you care for yourself at home? Activity  ? · Do not do strenuous exercise and do not lift, pull, or push anything heavy until your doctor says it is okay. This may be for a day or two. You can walk around the house and do light activity, such as cooking. ? · You may shower 24 to 48 hours after the procedure, if your doctor okays it. Pat the incision dry. Do not take a bath for 1 week, or until your doctor tells you it is okay. ? · If the catheter was placed in your groin, try not to walk up stairs for the first couple of days. ? · If the catheter was placed in your arm near your wrist, do not bend your wrist deeply for the first couple of days. Be careful using your hand to get into and out of a chair or bed. ? · If your doctor recommends it, get more exercise. Walking is a good choice. Bit by bit, increase the amount you walk every day.  Try for at least 30 minutes on most days of the week.   Diet  ? · Drink plenty of fluids to help your body flush out the dye. If you have kidney, heart, or liver disease and have to limit fluids, talk with your doctor before you increase the amount of fluids you drink. ? · You can eat your normal diet. If your stomach is upset, try bland, low-fat foods like plain rice, broiled chicken, toast, and yogurt. Medicines  ? · Be safe with medicines. Read and follow all instructions on the label. ¨ If the doctor gave you a prescription medicine for pain, take it as prescribed. ¨ If you are not taking a prescription pain medicine, ask your doctor if you can take an over-the-counter medicine. ? · If you take blood thinners, such as warfarin (Coumadin), clopidogrel (Plavix), or aspirin, be sure to talk to your doctor. He or she will tell you if and when to start taking those medicines again. Make sure that you understand exactly what your doctor wants you to do.   ? · Your doctor will tell you if and when you can restart your medicines. He or she will also give you instructions about taking any new medicines. ?Care of the catheter site  ? · You will have a dressing over the cut (incision). A dressing helps the incision heal and protects it. Your doctor will tell you how to take care of this. ? · Put ice or a cold pack on the area for 10 to 20 minutes at a time to help with soreness or swelling. Put a thin cloth between the ice and your skin. Follow-up care is a key part of your treatment and safety. Be sure to make and go to all appointments, and call your doctor if you are having problems. It's also a good idea to know your test results and keep a list of the medicines you take. When should you call for help? Call 911 anytime you think you may need emergency care. For example, call if:  ? · You passed out (lost consciousness). ? · You have severe trouble breathing. ? · You have sudden chest pain and shortness of breath, or you cough up blood.    ?Call your doctor now or seek immediate medical care if:  ? · You are bleeding from the area where the catheter was put in your artery. ? · You have a fast-growing, painful lump at the catheter site. ? · You have signs of infection, such as:  ¨ Increased pain, swelling, warmth, or redness. ¨ Red streaks leading from the incision. ¨ Pus draining from the incision. ¨ A fever. ? Watch closely for any changes in your health, and be sure to contact your doctor if:  ? · You don't get better as expected. Where can you learn more? Go to http://gladys-calvin.info/. Enter V808 in the search box to learn more about \"Angiogram: What to Expect at Home. \"  Current as of: October 14, 2016  Content Version: 11.4  © 0517-8532 [employer] Image by [employer]. Care instructions adapted under license by [employer] (which disclaims liability or warranty for this information). If you have questions about a medical condition or this instruction, always ask your healthcare professional. [place] [address] any warranty or liability for your use of this information. DISCHARGE SUMMARY from Nurse    PATIENT INSTRUCTIONS:    After general anesthesia or intravenous sedation, for 24 hours or while taking prescription Narcotics:  · Limit your activities  · Do not drive and operate hazardous machinery  · Do not make important personal or business decisions  · Do  not drink alcoholic beverages  · If you have not urinated within 8 hours after discharge, please contact your surgeon on call.     Report the following to your surgeon:  · Excessive pain, swelling, redness or odor of or around the surgical area  · Temperature over 100.5  · Nausea and vomiting lasting longer than 4 hours or if unable to take medications  · Any signs of decreased circulation or nerve impairment to extremity: change in color, persistent  numbness, tingling, coldness or increase pain  · Any questions    What to do at Home:  Recommended activity: Activity as tolerated,       *  Please give a list of your current medications to your Primary Care Provider. *  Please update this list whenever your medications are discontinued, doses are      changed, or new medications (including over-the-counter products) are added. *  Please carry medication information at all times in case of emergency situations. These are general instructions for a healthy lifestyle:    No smoking/ No tobacco products/ Avoid exposure to second hand smoke  Surgeon General's Warning:  Quitting smoking now greatly reduces serious risk to your health. Obesity, smoking, and sedentary lifestyle greatly increases your risk for illness    A healthy diet, regular physical exercise & weight monitoring are important for maintaining a healthy lifestyle    You may be retaining fluid if you have a history of heart failure or if you experience any of the following symptoms:  Weight gain of 3 pounds or more overnight or 5 pounds in a week, increased swelling in our hands or feet or shortness of breath while lying flat in bed. Please call your doctor as soon as you notice any of these symptoms; do not wait until your next office visit. Recognize signs and symptoms of STROKE:    F-face looks uneven    A-arms unable to move or move unevenly    S-speech slurred or non-existent    T-time-call 911 as soon as signs and symptoms begin-DO NOT go       Back to bed or wait to see if you get better-TIME IS BRAIN. Warning Signs of HEART ATTACK     Call 911 if you have these symptoms:   Chest discomfort. Most heart attacks involve discomfort in the center of the chest that lasts more than a few minutes, or that goes away and comes back. It can feel like uncomfortable pressure, squeezing, fullness, or pain.  Discomfort in other areas of the upper body. Symptoms can include pain or discomfort in one or both arms, the back, neck, jaw, or stomach.    Shortness of breath with or without chest discomfort.  Other signs may include breaking out in a cold sweat, nausea, or lightheadedness. Don't wait more than five minutes to call 911 - MINUTES MATTER! Fast action can save your life. Calling 911 is almost always the fastest way to get lifesaving treatment. Emergency Medical Services staff can begin treatment when they arrive -- up to an hour sooner than if someone gets to the hospital by car. The discharge information has been reviewed with the patient and caregiver. The patient and caregiver verbalized understanding. Discharge medications reviewed with the patient and caregiver and appropriate educational materials and side effects teaching were provided.       Patient armband removed and shredded    ___________________________________________________________________________________________________________________________________

## 2018-02-26 NOTE — PROGRESS NOTES
Pt arrived on unit; Pt Alert and Oriented; Consent signed; See MAC_lab for sedation report and/or vital signs. Pt transferred to treatment table for procedure. Dr. La Spear ordering fentanyl with benadryl for hives.

## 2018-02-26 NOTE — H&P (VIEW-ONLY)
Kat Abbott    Chief Complaint   Patient presents with    Leg Pain       History and Physical    Mr. Evelin Holguin is a 65year old male who returns to our office for continued management of his bilateral leg pain. He does not wear compression stockings because they hurt his skin. He also reports chronic right lower abdominal pain, which has been present for \"a long time\". This pain is worsened by laying on his right side and improves when he lays on his left side. He has been worked up by GI for this with no resolution. Past Medical History:   Diagnosis Date    Burning with urination     Elevated PSA 3/14/2012    Hypercholesterolemia     Hypertension     [facility] ([facility] [address]     Stroke ([facility] [address]     Thromboembolus [facility])      Patient Active Problem List   Diagnosis Code    Elevated PSA R97.20    History of CVA (cerebrovascular accident) Z80.78    History of DVT (deep vein thrombosis) Z86.718    Bruit of left carotid artery R09.89    Pain in both lower extremities M79.604, M79.605     Past Surgical History:   Procedure Laterality Date    HX COLONOSCOPY      HX PROSTATECTOMY      HX UROLOGICAL      IR STENT PLACEMENT  2002    VASCULAR SURGERY PROCEDURE UNLIST       Current Outpatient Prescriptions   Medication Sig Dispense Refill    HYDROcodone-acetaminophen (NORCO) 5-325 mg per tablet Take  by mouth.  atorvastatin (LIPITOR) 20 mg tablet Take  by mouth daily.  losartan (COZAAR) 100 mg tablet Take 100 mg by mouth daily.  carvedilol (COREG) 25 mg tablet Take 25 mg by mouth two (2) times daily (with meals).  cholecalciferol (VITAMIN D3) 1,000 unit cap Take  by mouth daily.  hydroCHLOROthiazide (MICROZIDE) 12.5 mg capsule Take 12.5 mg by mouth daily.  spironolactone (ALDACTONE) 25 mg tablet Take  by mouth daily.  RABEprazole (ACIPHEX) 20 mg tablet Take 20 mg by mouth daily.  ranitidine (ZANTAC) 150 mg tablet Take 150 mg by mouth two (2) times a day.  warfarin (COUMADIN) 7.5 mg tablet Take 7.5 mg by mouth daily.  warfarin (COUMADIN) 5 mg tablet Take 5 mg by mouth daily.  niacin (NIASPAN) 1,000 mg Tb24 tab Take  by mouth nightly.  aspirin 81 mg chewable tablet Take 81 mg by mouth daily.  simvastatin (ZOCOR) 40 mg tablet Take  by mouth nightly.  atenolol (TENORMIN) 50 mg tablet Take  by mouth daily.  phenytoin ER (DILANTIN EXTENDED) 100 mg ER capsule Take  by mouth.  lisinopril (PRINIVIL, ZESTRIL) 40 mg tablet Take 40 mg by mouth daily. Allergies   Allergen Reactions    Fentanyl Unknown (comments)    Penicillins Unknown (comments)     Social History     Social History    Marital status:      Spouse name: N/A    Number of children: N/A    Years of education: N/A     Occupational History    Not on file. Social History Main Topics    Smoking status: Former Smoker     Types: Cigarettes     Quit date: 2/6/1980    Smokeless tobacco: Never Used    Alcohol use No    Drug use: No    Sexual activity: Not Currently     Other Topics Concern    Not on file     Social History Narrative      History reviewed. No pertinent family history. Review of Systems    Review of Systems   Constitutional: Negative for chills, fever, malaise/fatigue and weight loss. HENT: Negative for ear pain and hearing loss. Eyes: Negative for blurred vision, pain and discharge. Respiratory: Negative for cough, hemoptysis, sputum production and shortness of breath. Cardiovascular: Positive for leg swelling. Negative for chest pain, palpitations and orthopnea. Gastrointestinal: Positive for abdominal pain and heartburn. Negative for nausea and vomiting. Genitourinary: Negative for dysuria and urgency. Musculoskeletal: Positive for joint pain and myalgias. Negative for falls. Skin: Negative for itching and rash. Neurological: Negative for dizziness, tingling, weakness and headaches. Endo/Heme/Allergies: Does not bruise/bleed easily. Psychiatric/Behavioral: Negative for depression. Physical Exam:    Visit Vitals    /80 (BP 1 Location: Left arm, BP Patient Position: Sitting)    Pulse 68    Resp 18    Ht 5' 6\" (1.676 m)    Wt 180 lb (81.6 kg)    BMI 29.05 kg/m2      Physical Examination: General appearance - alert, well appearing, and in no distress and chronically ill appearing  Mental status - alert, oriented to person, place, and time, normal mood, behavior, speech, dress, motor activity, and thought processes  Eyes - left eye normal, right eye normal  Ears - right ear normal, left ear normal  Mouth - mucous membranes moist  Chest - clear to auscultation, no wheezes  Heart - normal rate and regular rhythm  Abdomen - soft, round, tender to palpation, nondistended  Extremities - warm and well-perfused, palpable pedal pulses, 1+ edema bilaterally  Skin - normal coloration and turgor, no rashes, no suspicious skin lesions noted      Impression and Plan:    ICD-10-CM ICD-9-CM    1. Pain in both lower extremities M79.604 729.5 CBC W/O DIFF    X82.579  METABOLIC PANEL, BASIC      PROTHROMBIN TIME + INR   2. Leg swelling M79.89 729.81      Orders Placed This Encounter    CBC W/O DIFF    METABOLIC PANEL, BASIC    PROTHROMBIN TIME + INR     I had a discussion with Mr. Jossy Bustillo and his son regarding his recent imaging studies. His carotid duplex shows > 50% narrowing bilaterally, we will follow this with annual carotid duplexes. 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 I recommend he try to elevate his legs when possible and wear compression, if tolerated, to help with the swelling. I also recommended a venogram to look for a proximal stenosis that could be contributing to his symptoms. I explained the risks and benefits of the procedure and Mr. Jossy Bustillo would like to proceed.   We will arrange for venogram as soon as feasible. Follow-up Disposition:  Return in about 3 weeks (around 3/13/2018). The treatment plan was reviewed with the patient in detail. The patient voiced understanding of this plan and all questions and concerns were addressed. The patient agrees with this plan. We discussed the signs and symptoms that would require earlier attention or intervention. The patient was given educational material related to his/her visit and the patient has voiced understanding of the material.     I appreciate the opportunity to participate in the care of your patient. I will be sure to keep you informed of any subsequent changes in the treatment plan. If you have any questions or concerns, please feel free to contact me. Shannan Watson NP    PLEASE NOTE:  This document has been produced using voice recognition software. Unrecognized errors in transcription may be present.

## 2018-02-26 NOTE — PROGRESS NOTES
TRANSFER - OUT REPORT:    Verbal report given to Daily Thompson RN on Kat Friedmandock  being transferred to [facility](unit) for ordered procedure       Report consisted of patients Situation, Background, Assessment and   Recommendations(SBAR). Information from the following report(s) SBAR, Kardex and MAR was reviewed with the receiving nurse. Lines:   Peripheral IV 02/26/18 Left Antecubital (Active)   Site Assessment Clean, dry, & intact 2/26/2018  7:12 AM   Phlebitis Assessment 0 2/26/2018  7:12 AM   Infiltration Assessment 0 2/26/2018  7:12 AM   Dressing Status Clean, dry, & intact 2/26/2018  7:12 AM   Dressing Type Tape;Transparent 2/26/2018  7:12 AM   Hub Color/Line Status Blue;Flushed; Infusing 2/26/2018  7:12 AM   Action Taken Open ports on tubing capped 2/26/2018  7:12 AM   Alcohol Cap Used Yes 2/26/2018  7:12 AM        Opportunity for questions and clarification was provided.       Patient transported with:   Registered Nurse

## 2018-03-13 ENCOUNTER — OFFICE VISIT (OUTPATIENT)
Dept: VASCULAR SURGERY | Age: 78
End: 2018-03-13

## 2018-03-13 VITALS
HEART RATE: 70 BPM | HEIGHT: 66 IN | BODY MASS INDEX: 29.89 KG/M2 | SYSTOLIC BLOOD PRESSURE: 156 MMHG | DIASTOLIC BLOOD PRESSURE: 80 MMHG | WEIGHT: 186 LBS

## 2018-03-13 DIAGNOSIS — M79.89 LEG SWELLING: ICD-10-CM

## 2018-03-13 DIAGNOSIS — I82.220 INFERIOR VENA CAVA OCCLUSION (HCC): Primary | ICD-10-CM

## 2018-03-13 DIAGNOSIS — M79.604 PAIN IN BOTH LOWER EXTREMITIES: ICD-10-CM

## 2018-03-13 DIAGNOSIS — M79.605 PAIN IN BOTH LOWER EXTREMITIES: ICD-10-CM

## 2018-03-13 DIAGNOSIS — I80.9 PHLEBITIS: ICD-10-CM

## 2018-03-13 NOTE — MR AVS SNAPSHOT
[address] [place] [address] 
[phone] Patient: Bimal Carlos MRN: [id number] WFI:[id number] Visit Information Date & Time Provider Department Dept. Phone Encounter #  
 3/13/2018  8:15 AM [facility] [address] [id number] Upcoming Health Maintenance Date Due DTaP/Tdap/Td series (1 - Tdap) 9/2/1960 ZOSTER VACCINE AGE 60> 7/2/1999 GLAUCOMA SCREENING Q2Y 9/2/2004 Bone Densitometry (Dexa) Screening 9/2/2004 Pneumococcal 65+ Low/Medium Risk (1 of 2 - PCV13) 9/2/2004 MEDICARE YEARLY EXAM 9/2/2004 Allergies as of 3/13/2018  Review Complete On: 2/26/2018 By: Katalina Munguia RN Severity Noted Reaction Type Reactions Fentanyl  03/14/2012    Unknown (comments) Penicillins  03/14/2012    Unknown (comments) Current Immunizations  Never Reviewed No immunizations on file. Not reviewed this visit Vitals BP Pulse Height(growth percentile) Weight(growth percentile) BMI Smoking Status 156/80 (BP 1 Location: Left arm, BP Patient Position: Sitting) 70 5' 6\" (1.676 m) 186 lb (84.4 kg) 30.02 kg/m2 Former Smoker BMI and BSA Data Body Mass Index Body Surface Area 30.02 kg/m 2 1.98 m 2 Preferred Pharmacy Pharmacy Name Phone [address], [address] [place] [address] [phone] Your Updated Medication List  
  
   
This list is accurate as of 3/13/18 11:42 AM.  Always use your most recent med list.  
  
  
  
  
 ACIPHEX 20 mg tablet Generic drug:  RABEprazole Take 20 mg by mouth daily. aspirin 81 mg chewable tablet Take 81 mg by mouth daily. atorvastatin 20 mg tablet Commonly known as:  LIPITOR Take  by mouth daily. carvedilol 25 mg tablet Commonly known as:  Daryle Rubins Take 25 mg by mouth two (2) times daily (with meals). DILANTIN EXTENDED 100 mg ER capsule Generic drug:  phenytoin ER Take  by mouth. hydroCHLOROthiazide 12.5 mg capsule Commonly known as:  Anya Erasto Take 12.5 mg by mouth daily. HYDROcodone-acetaminophen 5-325 mg per tablet Commonly known as:  Perlita Apa Take  by mouth.  
  
 lisinopril 40 mg tablet Commonly known as:  Rennis Douse Take 40 mg by mouth daily. losartan 100 mg tablet Commonly known as:  COZAAR Take 100 mg by mouth daily. Niaspan 1,000 mg Tb24 tab Generic drug:  niacin Take  by mouth nightly. spironolactone 25 mg tablet Commonly known as:  ALDACTONE Take  by mouth daily. VITAMIN D3 1,000 unit Cap Generic drug:  cholecalciferol Take  by mouth daily. * warfarin 7.5 mg tablet Commonly known as:  COUMADIN Take 7.5 mg by mouth daily. * warfarin 5 mg tablet Commonly known as:  COUMADIN Take 5 mg by mouth daily. ZANTAC 150 mg tablet Generic drug:  raNITIdine Take 150 mg by mouth two (2) times a day. ZOCOR 40 mg tablet Generic drug:  simvastatin Take  by mouth nightly. * Notice: This list has 2 medication(s) that are the same as other medications prescribed for you. Read the directions carefully, and ask your doctor or other care provider to review them with you. Please provide this summary of care documentation to your next provider. Your primary care clinician is listed as Palmira Fall. If you have any questions after today's visit, please call [phone].

## 2018-03-13 NOTE — PROGRESS NOTES
Rizwana David    Chief Complaint   Patient presents with   [facility] Follow Up       History and Physical    Mr. Kim Eagle is a 65year old gentleman who returns to our office for management of his bilateral leg swelling and pain. He recently had a venogram on 2/26 during which no intervention was able to be preformed. Mr. Kim Eagle is extremely frustrated about the outcome of his procedure and states \"I had the procedure for nothing\". He is still having swelling and pain in his legs and has not been wearing compression because the stockings hurt his legs. Mr. Kim Eagle also reports a painful, reddened, area above his right knee, which started a few days ago. He has not tried anything for symptom relief. Past Medical History:   Diagnosis Date    Burning with urination     Elevated PSA 3/14/2012    Hypercholesterolemia     Hypertension     [facility]ipheral [facility]     Stroke ([facility]     Thromboembolus [facility])      Patient Active Problem List   Diagnosis Code    Elevated PSA R97.20    History of CVA (cerebrovascular accident) Z80.78    History of DVT (deep vein thrombosis) Z86.718    Bruit of left carotid artery R09.89    Pain in both lower extremities M79.604, M79.605     Past Surgical History:   Procedure Laterality Date    HX COLONOSCOPY      HX PROSTATECTOMY      HX UROLOGICAL      IR STENT PLACEMENT  2002    VASCULAR SURGERY PROCEDURE UNLIST       Current Outpatient Prescriptions   Medication Sig Dispense Refill    atorvastatin (LIPITOR) 20 mg tablet Take  by mouth daily.  losartan (COZAAR) 100 mg tablet Take 100 mg by mouth daily.  carvedilol (COREG) 25 mg tablet Take 25 mg by mouth two (2) times daily (with meals).  cholecalciferol (VITAMIN D3) 1,000 unit cap Take  by mouth daily.  hydroCHLOROthiazide (MICROZIDE) 12.5 mg capsule Take 12.5 mg by mouth daily.  RABEprazole (ACIPHEX) 20 mg tablet Take 20 mg by mouth daily.         ranitidine (ZANTAC) 150 mg tablet Take 150 mg by mouth two (2) times a day.  warfarin (COUMADIN) 7.5 mg tablet Take 7.5 mg by mouth daily.  warfarin (COUMADIN) 5 mg tablet Take 5 mg by mouth daily.  aspirin 81 mg chewable tablet Take 81 mg by mouth daily.  phenytoin ER (DILANTIN EXTENDED) 100 mg ER capsule Take  by mouth.  HYDROcodone-acetaminophen (NORCO) 5-325 mg per tablet Take  by mouth.  spironolactone (ALDACTONE) 25 mg tablet Take  by mouth daily.  niacin (NIASPAN) 1,000 mg Tb24 tab Take  by mouth nightly.  simvastatin (ZOCOR) 40 mg tablet Take  by mouth nightly.  lisinopril (PRINIVIL, ZESTRIL) 40 mg tablet Take 40 mg by mouth daily. Allergies   Allergen Reactions    Fentanyl Unknown (comments)    Penicillins Unknown (comments)     Social History     Social History    Marital status:      Spouse name: N/A    Number of children: N/A    Years of education: N/A     Occupational History    Not on file. Social History Main Topics    Smoking status: Former Smoker     Types: Cigarettes     Quit date: 2/6/1980    Smokeless tobacco: Never Used    Alcohol use No    Drug use: No    Sexual activity: Not Currently     Other Topics Concern    Not on file     Social History Narrative      History reviewed. No pertinent family history. Review of Systems    Review of Systems   Constitutional: Negative for chills, fever, malaise/fatigue and weight loss. HENT: Negative for ear pain and hearing loss. Eyes: Negative for blurred vision, pain and discharge. Respiratory: Negative for cough, hemoptysis and sputum production. Cardiovascular: Positive for leg swelling. Negative for chest pain, palpitations and orthopnea. Gastrointestinal: Negative for heartburn, nausea and vomiting. Genitourinary: Negative for dysuria and urgency. Musculoskeletal:        +bilateral leg pain   Skin: Negative for itching and rash.    Neurological: Negative for dizziness, tingling, weakness and headaches. Endo/Heme/Allergies: Does not bruise/bleed easily. Psychiatric/Behavioral: Negative for depression. Physical Exam:    Visit Vitals    /80 (BP 1 Location: Left arm, BP Patient Position: Sitting)    Pulse 70    Ht 5' 6\" (1.676 m)    Wt 186 lb (84.4 kg)    BMI 30.02 kg/m2      Physical Examination: General appearance - alert, well appearing, and in no distress  Mental status - alert, oriented to person, place, and time, normal speech, dress, motor activity, and thought processes, mood is irritated  Eyes - left eye normal, right eye normal  Ears - right ear normal, left ear normal  Mouth - mucous membranes moist  Chest - clear to auscultation, no wheezes  Abdomen - soft, nontender, nondistended  Musculoskeletal - no obvious deformity  Extremities - warm and well perfused, bilateral non-pitting edema. Hard, reddened vein superior and medial to right knee, tender to palpation. Right groin soft. Skin - old bruising to right groin and upper thigh area, groin and thigh tissue soft      Impression and Plan:    ICD-10-CM ICD-9-CM    1. 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 Pain in both lower extremities M79.604 729.5     M79.605     3. Leg swelling M79.89 729.81      No orders of the defined types were placed in this encounter. I explained to Mr. Wolfgang Cain that his venogram revealed a near occlusion of his IVC with multiple venous collaterals, which could not be fixed during the procedure. I recommended he wear compression and suggested he try moderate instead of firm compression to see if this was more tolerable for him. The hardened, painful vein is likely phlebitis. I suggested he use warm compresses and NSAIDS for this and it should resolve. I requested to see Mr. Wolfgang Cain again in one month to discuss further options after he tries compression and he stated he does not want to return.   I told Mr. Wolfgang Cain to call us for an appointment if he changes his mind.    Follow-up Disposition:  Return in about 1 month (around 4/13/2018). The treatment plan was reviewed with the patient in detail. The patient voiced understanding of this plan and all questions and concerns were addressed. The patient agrees with this plan. We discussed the signs and symptoms that would require earlier attention or intervention. The patient was given educational material related to his/her visit and the patient has voiced understanding of the material.     I appreciate the opportunity to participate in the care of your patient. I will be sure to keep you informed of any subsequent changes in the treatment plan. If you have any questions or concerns, please feel free to contact me. Ofelia Farmer NP    PLEASE NOTE:  This document has been produced using voice recognition software. Unrecognized errors in transcription may be present.

## 2019-02-25 ENCOUNTER — HOSPITAL ENCOUNTER (OUTPATIENT)
Age: 79
Discharge: HOME | End: 2019-02-25
Attending: UROLOGY
Payer: MEDICARE

## 2019-02-25 DIAGNOSIS — R97.20 PSA ELEVATION: ICD-10-CM

## 2019-02-25 LAB — CREAT UR-MCNC: 1.5 MG/DL (ref 0.6–1.3)

## 2019-02-25 PROCEDURE — 74011250636 HC RX REV CODE- 250/636: Performed by: UROLOGY

## 2019-02-25 PROCEDURE — 72197 MRI PELVIS W/O & W/DYE: CPT

## 2019-02-25 PROCEDURE — 82565 ASSAY OF CREATININE: CPT

## 2019-02-25 PROCEDURE — A9577 INJ MULTIHANCE: HCPCS | Performed by: UROLOGY

## 2019-02-25 RX ADMIN — GADOBENATE DIMEGLUMINE 20 ML: 529 INJECTION, SOLUTION INTRAVENOUS at 09:00

## 2019-07-23 ENCOUNTER — OFFICE VISIT (OUTPATIENT)
Dept: VASCULAR SURGERY | Age: 79
End: 2019-07-23

## 2019-07-23 VITALS
HEIGHT: 66 IN | WEIGHT: 186 LBS | SYSTOLIC BLOOD PRESSURE: 160 MMHG | BODY MASS INDEX: 29.89 KG/M2 | DIASTOLIC BLOOD PRESSURE: 80 MMHG | HEART RATE: 92 BPM | RESPIRATION RATE: 18 BRPM

## 2019-07-23 DIAGNOSIS — I82.411 ACUTE DEEP VEIN THROMBOSIS (DVT) OF RIGHT FEMORAL VEIN (HCC): ICD-10-CM

## 2019-07-23 DIAGNOSIS — I73.9 PAD (PERIPHERAL ARTERY DISEASE) (HCC): ICD-10-CM

## 2019-07-23 DIAGNOSIS — I82.220 INFERIOR VENA CAVA OCCLUSION (HCC): Primary | ICD-10-CM

## 2019-07-23 RX ORDER — AMLODIPINE BESYLATE 10 MG/1
TABLET ORAL DAILY
COMMUNITY

## 2019-07-23 NOTE — PROGRESS NOTES
Alycia Moore    Chief Complaint   Patient presents with    New Patient    Swelling       History and Physical    69-year-old male with history of DVT and inferior vena cava filter placement. This is been many years on that now. He has a history of a venogram with possible inferior vena cava interruption. He was at [facility] in March of this year with a new recurrent DVT and is on warfarin therapy. He tells me his leg hurts all the time the only relief he gets is when he places it up and lays flat in bed. 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 Past Medical History:   Diagnosis Date    Burning with urination     Elevated PSA 3/14/2012    Hypercholesterolemia     Hypertension     [facility]     Stroke ([facility]     [facility]rom[facility]emb[facility]us [facility])      Patient Active Problem List   Diagnosis Code    Elevated PSA R97.20    History of CVA (cerebrovascular accident) Z80.78    History of DVT (deep vein thrombosis) Z86.718    Bruit of left carotid artery R09.89    Pain in both lower extremities M79.604, M79.605     Past Surgical History:   Procedure Laterality Date    HX COLONOSCOPY      HX PROSTATECTOMY      HX UROLOGICAL      IR STENT PLACEMENT  2002    VASCULAR SURGERY PROCEDURE UNLIST       Current Outpatient Medications   Medication Sig Dispense Refill    amLODIPine (NORVASC) 10 mg tablet Take  by mouth daily.  atorvastatin (LIPITOR) 20 mg tablet Take  by mouth daily.  losartan (COZAAR) 100 mg tablet Take 100 mg by mouth daily.  hydroCHLOROthiazide (MICROZIDE) 12.5 mg capsule Take 12.5 mg by mouth daily.  warfarin (COUMADIN) 7.5 mg tablet Take 7.5 mg by mouth daily.  warfarin (COUMADIN) 5 mg tablet Take 5 mg by mouth daily.  aspirin 81 mg chewable tablet Take 81 mg by mouth daily.  phenytoin ER (DILANTIN EXTENDED) 100 mg ER capsule Take  by mouth.          Allergies   Allergen Reactions    Fentanyl Unknown (comments)    Penicillins Unknown (comments)       Review of Systems    A full review of systems was completed times ten organ systems and was deemed negative unless otherwise mentioned in the HPI. Physical   Visit Vitals  /80 (BP 1 Location: Left arm, BP Patient Position: Sitting)   Pulse 92   Resp 18   Ht 5' 6\" (1.676 m)   Wt 186 lb (84.4 kg)   BMI 30.02 kg/m²       He walked in today without an assistant  Head is normocephalic  Neck no JVD  Chest clear  Cardiac regular rhythm  Abdomen soft nontender  Varicose veins and some mild edema bilateral  Nonpalpable pedal pulses hand-held Doppler I could not find the dorsalis pedis and the posterior tibial seemed weak. Feet are warm    Impression/Plan:     ICD-10-CM ICD-9-CM    1. 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 PAD (peripheral artery disease) ([facility]) I73.9 443.9    3. Acute deep vein thrombosis (DVT) of right femoral vein ([facility]) I82.411 453.41      I searched for arterial studies none were done since 2012  We will order arterial PVL and venous study to see the status of this new DVT  Had a lengthy discussion with him and his son will try to look at these arteries and veins. If he does not have arterial disease we will talk with Dr. Kinsgley Caputo about caval procedures. Orders Placed This Encounter    amLODIPine (NORVASC) 10 mg tablet           Saeed Robert MD    PLEASE NOTE:  This document has been produced using voice recognition software. Unrecognized errors in transcription may be present.

## 2019-08-14 ENCOUNTER — OFFICE VISIT (OUTPATIENT)
Dept: VASCULAR SURGERY | Age: 79
End: 2019-08-14

## 2019-08-14 VITALS
RESPIRATION RATE: 17 BRPM | SYSTOLIC BLOOD PRESSURE: 150 MMHG | HEART RATE: 80 BPM | DIASTOLIC BLOOD PRESSURE: 82 MMHG | HEIGHT: 66 IN | WEIGHT: 186 LBS | BODY MASS INDEX: 29.89 KG/M2

## 2019-08-14 DIAGNOSIS — Z86.73 HISTORY OF CVA (CEREBROVASCULAR ACCIDENT): ICD-10-CM

## 2019-08-14 DIAGNOSIS — M79.604 RIGHT LEG PAIN: ICD-10-CM

## 2019-08-14 DIAGNOSIS — G81.91 RIGHT HEMIPARESIS (HCC): ICD-10-CM

## 2019-08-14 DIAGNOSIS — I82.220 INFERIOR VENA CAVA OCCLUSION (HCC): Primary | ICD-10-CM

## 2019-08-14 DIAGNOSIS — Z86.718 HISTORY OF DVT (DEEP VEIN THROMBOSIS): ICD-10-CM

## 2019-08-14 NOTE — PROGRESS NOTES
Monica Lemons    Chief Complaint   Patient presents with    Swelling       History and Physical    56-year-old male with history of DVT and inferior vena cava filter placement done years ago at [facility]. He has a history of a venogram with possible inferior vena cava interruption. He was at [facility] in March of this year with a new recurrent DVT and is on chronic anticoagulation with warfarin therapy. He presents to the office today with his son. He complains of chronic right leg pain. The only relief he gets is when he elevates the leg or lays flat in bed. 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 He did have vascular studies done in our office today as follows. Leg art:   No hemodynamically significant arterial disease is identified within the left lower extremity at rest  Mild to moderate arterial disease is identified within the right lower extremity at rest disease appears to be within the femoral-popliteal and tibial segments. LINK on the right is 0.78 and on the left 0.97. Toe pressures are 90 and above bilaterally. TBI on the right is 0.6 and on the left 0.79.     Venous duplex:   No evidence of acute DVT within bilateral lower extremities  Chronic weblike nonocclusive thrombus noted within bilateral common femoral, femoral, and popliteal veins  Venous reflux noted within bilateral femoral, popliteal, and tibial veins      Past Medical History:   Diagnosis Date    Burning with urination     Elevated PSA 3/14/2012    Hypercholesterolemia     Hypertension     [facility]ipheral vascular [facility]     Stroke ([facility]     [facility]emb[facility]us [facility])      Patient Active Problem List   Diagnosis Code    Elevated PSA R97.20    History of CVA (cerebrovascular accident) Z80.78    History of DVT (deep vein thrombosis) Z86.718    Bruit of left carotid artery R09.89    Pain in both lower extremities M79.604, M79.605     Past Surgical History:   Procedure Laterality Date    HX COLONOSCOPY      HX PROSTATECTOMY      HX UROLOGICAL      IR STENT PLACEMENT  2002    VASCULAR SURGERY PROCEDURE UNLIST       Current Outpatient Medications   Medication Sig Dispense Refill    amLODIPine (NORVASC) 10 mg tablet Take  by mouth daily.  atorvastatin (LIPITOR) 20 mg tablet Take  by mouth daily.  losartan (COZAAR) 100 mg tablet Take 100 mg by mouth daily.  hydroCHLOROthiazide (MICROZIDE) 12.5 mg capsule Take 12.5 mg by mouth daily.  warfarin (COUMADIN) 7.5 mg tablet Take 7.5 mg by mouth daily.  warfarin (COUMADIN) 5 mg tablet Take 5 mg by mouth daily.  aspirin 81 mg chewable tablet Take 81 mg by mouth daily.  phenytoin ER (DILANTIN EXTENDED) 100 mg ER capsule Take  by mouth. Allergies   Allergen Reactions    Fentanyl Unknown (comments)    Penicillins Unknown (comments)       Physical   Visit Vitals  /82 (BP 1 Location: Left arm, BP Patient Position: Sitting)   Pulse 80   Resp 17   Ht 5' 6\" (1.676 m)   Wt 186 lb (84.4 kg)   BMI 30.02 kg/m²       He walked in today without an assistant  Head is normocephalic  Neck no JVD  Chest no resp difficulty  Abdomen soft nontender  No significant BLE edema on exam today. Impression/Plan:   69-year-old male with history of bilateral lower extremity DVTs as well as chronically occluded IVC filter. He does have chronic pain in the right lower extremity. Imaging was reviewed with patient and his son in the office today. I discussed that he does have some mild to moderate arterial insufficiency in the right lower extremity but that his symptoms do not seem arterial in nature. I discussed that seeing as his pain improves with leg elevation his symptoms are most likely venous in nature. 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 Patient was discussed along with Dr. Page Garcia.   Dr. Page Garcia did offer repeat venogram in hopes to try and stent around the IVC filter to improve his venous outflow and hopefully improve his pain. Risk versus benefits of the procedure were discussed with patient and his son. They are understanding that if we are unable to perform stenting his pain may be chronic in nature and I did discuss the option of referral to pain management clinic. They both agree that this would be the very last option for them. He may also benefit from evaluation by neurology if his pain is unimproved. They both expressed understanding to all of the above and agreed to the plan. [address] [place] [address]    PLEASE NOTE:  This document has been produced using voice recognition software. Unrecognized errors in transcription may be present.

## 2019-08-14 NOTE — PROGRESS NOTES
1. Have you been to an emergency room or urgent care clinic since your last visit? NO    Hospitalized since your last visit? If yes, where, when, and reason for visit? NO  2. Have you seen or consulted any other health care providers outside of the [facility] since your last visit including any procedures, health maintenance items. If yes, where, when and reason for visit?  No

## 2019-08-14 NOTE — LETTER
8/14/19 Patient: Ioana Ricci YOB: 1940 Date of Visit: 8/14/2019 Adrianne Yeager MD 
[address] [place] [address] VIA Facsimile: [phone] Dear Adrianne Yeager MD, Thank you for referring Mr. Yuliana Vásquez to [facility] for evaluation. My notes for this consultation are attached. If you have questions, please do not hesitate to call me. I look forward to following your patient along with you. Sincerely, [address] [place] [address]

## 2019-08-14 NOTE — H&P (VIEW-ONLY)
Daniella Albright    Chief Complaint   Patient presents with    Swelling       History and Physical    19-year-old male with history of DVT and inferior vena cava filter placement done years ago at [facility]. He has a history of a venogram with possible inferior vena cava interruption. He was at [facility] in March of this year with a new recurrent DVT and is on chronic anticoagulation with warfarin therapy. He presents to the office today with his son. He complains of chronic right leg pain. The only relief he gets is when he elevates the leg or lays flat in bed. 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 He did have vascular studies done in our office today as follows. Leg art:   No hemodynamically significant arterial disease is identified within the left lower extremity at rest  Mild to moderate arterial disease is identified within the right lower extremity at rest disease appears to be within the femoral-popliteal and tibial segments. LINK on the right is 0.78 and on the left 0.97. Toe pressures are 90 and above bilaterally. TBI on the right is 0.6 and on the left 0.79.     Venous duplex:   No evidence of acute DVT within bilateral lower extremities  Chronic weblike nonocclusive thrombus noted within bilateral common femoral, femoral, and popliteal veins  Venous reflux noted within bilateral femoral, popliteal, and tibial veins      Past Medical History:   Diagnosis Date    Burning with urination     Elevated PSA 3/14/2012    Hypercholesterolemia     Hypertension     [facility]     Stroke ([facility]     [facility]embolus [facility])      Patient Active Problem List   Diagnosis Code    Elevated PSA R97.20    History of CVA (cerebrovascular accident) Z80.78    History of DVT (deep vein thrombosis) Z86.718    Bruit of left carotid artery R09.89    Pain in both lower extremities M79.604, M79.605     Past Surgical History:   Procedure Laterality Date    HX COLONOSCOPY      HX PROSTATECTOMY      HX UROLOGICAL      IR STENT PLACEMENT  2002    VASCULAR SURGERY PROCEDURE UNLIST       Current Outpatient Medications   Medication Sig Dispense Refill    amLODIPine (NORVASC) 10 mg tablet Take  by mouth daily.  atorvastatin (LIPITOR) 20 mg tablet Take  by mouth daily.  losartan (COZAAR) 100 mg tablet Take 100 mg by mouth daily.  hydroCHLOROthiazide (MICROZIDE) 12.5 mg capsule Take 12.5 mg by mouth daily.  warfarin (COUMADIN) 7.5 mg tablet Take 7.5 mg by mouth daily.  warfarin (COUMADIN) 5 mg tablet Take 5 mg by mouth daily.  aspirin 81 mg chewable tablet Take 81 mg by mouth daily.  phenytoin ER (DILANTIN EXTENDED) 100 mg ER capsule Take  by mouth. Allergies   Allergen Reactions    Fentanyl Unknown (comments)    Penicillins Unknown (comments)       Physical   Visit Vitals  /82 (BP 1 Location: Left arm, BP Patient Position: Sitting)   Pulse 80   Resp 17   Ht 5' 6\" (1.676 m)   Wt 186 lb (84.4 kg)   BMI 30.02 kg/m²       He walked in today without an assistant  Head is normocephalic  Neck no JVD  Chest no resp difficulty  Abdomen soft nontender  No significant BLE edema on exam today. Impression/Plan:   48-year-old male with history of bilateral lower extremity DVTs as well as chronically occluded IVC filter. He does have chronic pain in the right lower extremity. Imaging was reviewed with patient and his son in the office today. I discussed that he does have some mild to moderate arterial insufficiency in the right lower extremity but that his symptoms do not seem arterial in nature. I discussed that seeing as his pain improves with leg elevation his symptoms are most likely venous in nature. 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 Patient was discussed along with Dr. Casi Almazan.   Dr. Casi Almazan did offer repeat venogram in hopes to try and stent around the IVC filter to improve his venous outflow and hopefully improve his pain. Risk versus benefits of the procedure were discussed with patient and his son. They are understanding that if we are unable to perform stenting his pain may be chronic in nature and I did discuss the option of referral to pain management clinic. They both agree that this would be the very last option for them. He may also benefit from evaluation by neurology if his pain is unimproved. They both expressed understanding to all of the above and agreed to the plan. Qiana Gamble    PLEASE NOTE:  This document has been produced using voice recognition software. Unrecognized errors in transcription may be present.

## 2019-08-26 ENCOUNTER — TELEPHONE (OUTPATIENT)
Dept: VASCULAR SURGERY | Age: 79
End: 2019-08-26

## 2019-08-26 NOTE — TELEPHONE ENCOUNTER
The patient's son, Frankie Middleton, called for medication clarification for his father's upcoming procedure. Warfarin was discontinued but he was prescribed enoxaparin 80mg twice daily. Needs to know if this medicine should be stopped prior to the procedure. Please advise.

## 2019-08-26 NOTE — TELEPHONE ENCOUNTER
After discussing with dr. Shelbie rdz for patient to continue Lovenox through surgery then after procedure patient will transition back to coumadin once surgery is complete and therapeutic on coumadin. Called the son and he verbalized understanding.

## 2019-08-28 RX ORDER — SODIUM CHLORIDE 0.9 % (FLUSH) 0.9 %
5-40 SYRINGE (ML) INJECTION EVERY 8 HOURS
Status: CANCELLED | OUTPATIENT
Start: 2019-08-28

## 2019-08-28 RX ORDER — SODIUM CHLORIDE 0.9 % (FLUSH) 0.9 %
5-40 SYRINGE (ML) INJECTION AS NEEDED
Status: CANCELLED | OUTPATIENT
Start: 2019-08-28

## 2019-08-29 ENCOUNTER — HOSPITAL ENCOUNTER (OUTPATIENT)
Age: 79
Setting detail: OUTPATIENT SURGERY
Discharge: HOME | End: 2019-08-29
Attending: SURGERY | Admitting: SURGERY
Payer: MEDICARE

## 2019-08-29 VITALS
WEIGHT: 182 LBS | DIASTOLIC BLOOD PRESSURE: 64 MMHG | RESPIRATION RATE: 20 BRPM | BODY MASS INDEX: 29.25 KG/M2 | HEART RATE: 82 BPM | SYSTOLIC BLOOD PRESSURE: 131 MMHG | OXYGEN SATURATION: 92 % | HEIGHT: 66 IN

## 2019-08-29 DIAGNOSIS — I82.220 INFERIOR VENA CAVAL OCCLUSION (HCC): ICD-10-CM

## 2019-08-29 DIAGNOSIS — M79.604 RIGHT LEG PAIN: ICD-10-CM

## 2019-08-29 LAB
ACT BLD: 175 SECS (ref 79–138)
ANION GAP BLD CALC-SCNC: 17 MMOL/L (ref 10–20)
BUN BLD-MCNC: 20 MG/DL (ref 7–18)
CA-I BLD-MCNC: 1.21 MMOL/L (ref 1.12–1.32)
CHLORIDE BLD-SCNC: 101 MMOL/L (ref 100–108)
CO2 BLD-SCNC: 27 MMOL/L (ref 19–24)
CREAT UR-MCNC: 1.4 MG/DL (ref 0.6–1.3)
GLUCOSE BLD STRIP.AUTO-MCNC: 111 MG/DL (ref 74–106)
HCT VFR BLD CALC: 49 % (ref 36–49)
HGB BLD-MCNC: 16.7 G/DL (ref 12–16)
POTASSIUM BLD-SCNC: 3.5 MMOL/L (ref 3.5–5.5)
SODIUM BLD-SCNC: 140 MMOL/L (ref 136–145)

## 2019-08-29 PROCEDURE — 77030013744: Performed by: SURGERY

## 2019-08-29 PROCEDURE — 74011250636 HC RX REV CODE- 250/636: Performed by: SURGERY

## 2019-08-29 PROCEDURE — 77030013519 HC DEV INFL BASIX MRTM -B: Performed by: SURGERY

## 2019-08-29 PROCEDURE — 37238 OPEN/PERQ PLACE STENT SAME: CPT | Performed by: SURGERY

## 2019-08-29 PROCEDURE — 37253 INTRVASC US NONCORONARY ADDL: CPT | Performed by: SURGERY

## 2019-08-29 PROCEDURE — 99152 MOD SED SAME PHYS/QHP 5/>YRS: CPT | Performed by: SURGERY

## 2019-08-29 PROCEDURE — 75822 VEIN X-RAY ARMS/LEGS: CPT | Performed by: SURGERY

## 2019-08-29 PROCEDURE — C1874 STENT, COATED/COV W/DEL SYS: HCPCS | Performed by: SURGERY

## 2019-08-29 PROCEDURE — 77030004565 HC CATH ANGI DX TMPO CARD -B: Performed by: SURGERY

## 2019-08-29 PROCEDURE — C1769 GUIDE WIRE: HCPCS | Performed by: SURGERY

## 2019-08-29 PROCEDURE — C1725 CATH, TRANSLUMIN NON-LASER: HCPCS | Performed by: SURGERY

## 2019-08-29 PROCEDURE — 74011250636 HC RX REV CODE- 250/636

## 2019-08-29 PROCEDURE — 85347 COAGULATION TIME ACTIVATED: CPT

## 2019-08-29 PROCEDURE — 99153 MOD SED SAME PHYS/QHP EA: CPT | Performed by: SURGERY

## 2019-08-29 PROCEDURE — 75825 VEIN X-RAY TRUNK: CPT | Performed by: SURGERY

## 2019-08-29 PROCEDURE — C1894 INTRO/SHEATH, NON-LASER: HCPCS | Performed by: SURGERY

## 2019-08-29 PROCEDURE — 80047 BASIC METABLC PNL IONIZED CA: CPT

## 2019-08-29 PROCEDURE — 76937 US GUIDE VASCULAR ACCESS: CPT | Performed by: SURGERY

## 2019-08-29 PROCEDURE — C1753 CATH, INTRAVAS ULTRASOUND: HCPCS | Performed by: SURGERY

## 2019-08-29 PROCEDURE — 74011636320 HC RX REV CODE- 636/320: Performed by: SURGERY

## 2019-08-29 PROCEDURE — 37248 TRLUML BALO ANGIOP 1ST VEIN: CPT | Performed by: SURGERY

## 2019-08-29 PROCEDURE — 37252 INTRVASC US NONCORONARY 1ST: CPT | Performed by: SURGERY

## 2019-08-29 DEVICE — VENOVO® VENOUS STENT SYSTEM 20 MM X 80 MM (80 CM DELIVERY CATHETER)
Type: IMPLANTABLE DEVICE | Status: FUNCTIONAL
Brand: VENOVO®

## 2019-08-29 RX ORDER — HYDROMORPHONE HYDROCHLORIDE 2 MG/ML
INJECTION, SOLUTION INTRAMUSCULAR; INTRAVENOUS; SUBCUTANEOUS
Status: DISCONTINUED
Start: 2019-08-29 | End: 2019-08-29 | Stop reason: WASHOUT

## 2019-08-29 RX ORDER — LIDOCAINE HYDROCHLORIDE 10 MG/ML
INJECTION, SOLUTION EPIDURAL; INFILTRATION; INTRACAUDAL; PERINEURAL AS NEEDED
Status: DISCONTINUED | OUTPATIENT
Start: 2019-08-29 | End: 2019-08-29 | Stop reason: HOSPADM

## 2019-08-29 RX ORDER — MIDAZOLAM HYDROCHLORIDE 1 MG/ML
INJECTION, SOLUTION INTRAMUSCULAR; INTRAVENOUS AS NEEDED
Status: DISCONTINUED | OUTPATIENT
Start: 2019-08-29 | End: 2019-08-29 | Stop reason: HOSPADM

## 2019-08-29 RX ORDER — MORPHINE SULFATE 10 MG/ML
INJECTION, SOLUTION INTRAMUSCULAR; INTRAVENOUS AS NEEDED
Status: DISCONTINUED | OUTPATIENT
Start: 2019-08-29 | End: 2019-08-29 | Stop reason: HOSPADM

## 2019-08-29 RX ORDER — DIPHENHYDRAMINE HYDROCHLORIDE 50 MG/ML
50 INJECTION, SOLUTION INTRAMUSCULAR; INTRAVENOUS
Status: COMPLETED | OUTPATIENT
Start: 2019-08-29 | End: 2019-08-29

## 2019-08-29 RX ORDER — HEPARIN SODIUM 1000 [USP'U]/ML
INJECTION, SOLUTION INTRAVENOUS; SUBCUTANEOUS AS NEEDED
Status: DISCONTINUED | OUTPATIENT
Start: 2019-08-29 | End: 2019-08-29 | Stop reason: HOSPADM

## 2019-08-29 RX ORDER — HYDROMORPHONE HYDROCHLORIDE 2 MG/ML
.5-1 INJECTION, SOLUTION INTRAMUSCULAR; INTRAVENOUS; SUBCUTANEOUS
Status: DISCONTINUED | OUTPATIENT
Start: 2019-08-29 | End: 2019-08-29 | Stop reason: HOSPADM

## 2019-08-29 RX ADMIN — METHYLPREDNISOLONE SODIUM SUCCINATE 125 MG: 125 INJECTION, POWDER, FOR SOLUTION INTRAMUSCULAR; INTRAVENOUS at 13:33

## 2019-08-29 RX ADMIN — DIPHENHYDRAMINE HYDROCHLORIDE 50 MG: 50 INJECTION INTRAMUSCULAR; INTRAVENOUS at 13:03

## 2019-08-29 NOTE — Clinical Note
Peripheral Lesion 1. Balloon inflated using single inflation technique. Pressure = 0 jannet; Duration = 23 sec. Pressure = 10 jannet; Duration = 14 sec. Pressure = 10 jannet; Duration: 12 sec. Pressure = 10 jannet; Duration = 7 sec.

## 2019-08-29 NOTE — Clinical Note
Peripheral Lesion 2. Balloon inflated using single inflation technique. Pressure = 10 jannet; Duration = 75 sec.

## 2019-08-29 NOTE — Clinical Note
Contrast Dose Calculator:  
Patient's age: 77.  
Patient's sex: Male. Patient weight (kg) = 82.6. Creatinine level (mg/dL) = 1.4. Creatinine clearance (mL/min): 50.  
Contrast concentration (mg/mL) = 300. MACD = 295 mL. Max Contrast dose per Creatinine Cl calculator = 112.5 mL.

## 2019-08-29 NOTE — Clinical Note
TRANSFER - OUT REPORT:  
 
Verbal report given to: Marshall Mario RN. Report consisted of patient's Situation, Background, Assessment and  
Recommendations(SBAR). Opportunity for questions and clarification was provided. Patient transported with a Cardiac Cath Tech / Patient Care Tech. Patient transported to: [address]

## 2019-08-29 NOTE — Clinical Note
Sheath #1: sheath exchanged for [facility] W/GDWIRE 10FR 11CM -- BRITE TIP - ORDER AS EA.

## 2019-08-29 NOTE — Clinical Note
Peripheral Lesion 2. Pressure = 10 jannet; Duration = 60 sec. Pressure = 10 jannet; Duration = 60 sec.

## 2019-08-29 NOTE — PROGRESS NOTES
Cath holding summary    Patient escorted to cath holding from waiting area ambulatory, alert and oriented x 4, voicing no complaints. Changed into gown and placed on monitor. NPO since MN. Lab results, med rec and H&P reviewed on chart. PIV x 1 inserted without difficulty. Family to bedside. 1100 patient arrived to cath holding awake and alert, vital signs stable, right and left sites, clean dry and intact with no hematoma present, no C/O pain will continue to monitor. Ирина NOTE:    Patient informed of procedure and questions answered with review. 10F sheath in right thigh and 8F sheath  in left thigh pulled at 1112 and 1124 by Twila Howe RN  Hand hold and good hemostasis, with manual compression to site. Handhold for 20 minutes. Gauze and transparent dressing applied to site. No bleeding, no hematoma, no pain/discomfort at site. Groin instructions provided with review. Patient verbalized understanding. 1300 patient C/O itching to upper and a lower back, Dr Kingsley Caputo made aware 50 mg of benadryl ordered and given, will continue to monitor. 1330 redness noted to left hand and arm, Dr Kingsley Caputo made aware 125 mg of Solu-medrol ordered and given. 1421 patient discharged home with family, , vital signs stable, right and left sites, clean dry and intact with no hematoma present, no C/O pain, no C/O itching at this time.

## 2019-08-29 NOTE — Clinical Note
Sheath #2: sheath exchanged for [facility] W/GDWIRE 10FR 11CM -- BRITE TIP - ORDER AS EA.

## 2019-08-29 NOTE — Clinical Note
Peripheral Lesion 3. Balloon inflated using single inflation technique. Pressure = 10 jannet; Duration = 75 sec. Pressure = 10 jannet; Duration = 65 sec.

## 2019-08-29 NOTE — Clinical Note
TRANSFER - IN REPORT:  
 
Verbal report received from: Roney Tavarez RN. Report consisted of patient's Situation, Background, Assessment and  
Recommendations(SBAR). Opportunity for questions and clarification was provided. Assessment completed upon patient's arrival to unit and care assumed. Patient transported with a Cardiac Cath Tech / Patient Care Tech.

## 2019-08-29 NOTE — Clinical Note
Peripheral Lesion 3. Pressure = 10 jannet; Duration = 60 sec. Pressure = 10 jannet; Duration = 60 sec.

## 2019-08-29 NOTE — DISCHARGE INSTRUCTIONS
[address] Venogram Discharge Instructions    General Information:   A venogram is a procedure that allows the interventional radiologist to take pictures of the blood flow in the vascular system. A radiology contrast (or dye) is injected into the veins so that the condition of the veins and valves may be visualized. This procedure can be used to diagnose narrowing of the veins or the presence of a blood clot in the deep veins of the body. During this process, a stent, filter or a special intravenous line could be placed. Home Care Instructions: You can resume your regular diet. Do not shower, bathe, swim, drink alcohol, or make any important legal decisions in the next 48 hours. Do not lift anything heavier than a gallon of milk for 5 days. If you take Glucophage (metformin) for diabetes, do not take it for the next 48 hours. If you were asked to hold any blood thinners prior to the procedure, you may restart that medicine the day after the procedure is completed. Recline your car seat for the ride home. Call If:   You should call your Physician and/or the Radiology Nurse if you have any signs of infection; fever, drainage, redness, and/or swelling. If the puncture site should ooze, please call. Also call if you have any pain, decreased sensation, numbness, tingling, swelling, or change in color to the affected extremity. SEEK IMMEDIATE MEDICAL CARE IF YOUR PUNCTURE SITE STARTS TO BLEED. APPLY ENOUGH FIRM PRESSURE TO THE SITE WITH THE TIPS OF YOUR FINGERS TO STOP THE BLEEDING. Follow-Up Instructions:  Please see your ordering doctor as he/she has requested.       DISCHARGE SUMMARY from Nurse    PATIENT INSTRUCTIONS:    After general anesthesia or intravenous sedation, for 24 hours or while taking prescription Narcotics:  · Limit your activities  · Do not drive and operate hazardous machinery  · Do not make important personal or business decisions  · Do  not drink alcoholic beverages  · If you have not urinated within 8 hours after discharge, please contact your surgeon on call. Report the following to your surgeon:  · Excessive pain, swelling, redness or odor of or around the surgical area  · Temperature over 100.5  · Nausea and vomiting lasting longer than 4 hours or if unable to take medications  · Any signs of decreased circulation or nerve impairment to extremity: change in color, persistent  numbness, tingling, coldness or increase pain  · Any questions    What to do at Home:    *  Please give a list of your current medications to your Primary Care Provider. *  Please update this list whenever your medications are discontinued, doses are      changed, or new medications (including over-the-counter products) are added. *  Please carry medication information at all times in case of emergency situations. These are general instructions for a healthy lifestyle:    No smoking/ No tobacco products/ Avoid exposure to second hand smoke  Surgeon General's Warning:  Quitting smoking now greatly reduces serious risk to your health. Obesity, smoking, and sedentary lifestyle greatly increases your risk for illness    A healthy diet, regular physical exercise & weight monitoring are important for maintaining a healthy lifestyle    You may be retaining fluid if you have a history of heart failure or if you experience any of the following symptoms:  Weight gain of 3 pounds or more overnight or 5 pounds in a week, increased swelling in our hands or feet or shortness of breath while lying flat in bed. Please call your doctor as soon as you notice any of these symptoms; do not wait until your next office visit. The discharge information has been reviewed with the patient and caregiver. The patient and caregiver verbalized understanding.   Discharge medications reviewed with the patient and caregiver and appropriate educational materials and side effects teaching were provided.   ___________________________________________________________________________________________________________________________________    To Reach Us:     Patient Signature:  Date: 8/29/2019  Discharging Nurse: Adwoa Henry RN

## 2019-09-12 ENCOUNTER — OFFICE VISIT (OUTPATIENT)
Dept: VASCULAR SURGERY | Age: 79
End: 2019-09-12

## 2019-09-12 VITALS
BODY MASS INDEX: 29.25 KG/M2 | HEIGHT: 66 IN | SYSTOLIC BLOOD PRESSURE: 130 MMHG | WEIGHT: 182 LBS | DIASTOLIC BLOOD PRESSURE: 80 MMHG | RESPIRATION RATE: 17 BRPM

## 2019-09-12 DIAGNOSIS — Z86.73 HISTORY OF CVA (CEREBROVASCULAR ACCIDENT): ICD-10-CM

## 2019-09-12 DIAGNOSIS — G81.91 RIGHT HEMIPARESIS (HCC): ICD-10-CM

## 2019-09-12 DIAGNOSIS — Z86.718 HISTORY OF DVT (DEEP VEIN THROMBOSIS): ICD-10-CM

## 2019-09-12 DIAGNOSIS — I82.220 INFERIOR VENA CAVA OCCLUSION (HCC): Primary | ICD-10-CM

## 2019-09-12 NOTE — PROGRESS NOTES
1. Have you been to an emergency room or urgent care clinic since your last visit? NO    Hospitalized since your last visit? If yes, where, when, and reason for visit? NO  2. Have you seen or consulted any other health care providers outside of the [facility] since your last visit including any procedures, health maintenance items. If yes, where, when and reason for visit?  NO

## 2019-09-12 NOTE — PROGRESS NOTES
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 Filter was placed years ago at [facility]. 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 He presents to the office today with his son. He presents to the office today after venogram with balloon angioplasty of bilateral common iliac, external iliac, common femoral veins and transluminal intravascular placement of stent within the inferior vena cava filter 20 x 80 Venovo stent. He is doing well and states that his right leg pain is much improved. He does still get some shooting pains in the right leg however and states that the leg will jump on occasion. He does have a history of left sided stroke leaving him with right-sided hemiparesis and aphasia. He does also have mild to moderate arterial disease in the right lower extremity. He is not complaining of any classic claudication symptoms however. He also has no rest pain. Past Medical History:   Diagnosis Date    Burning with urination     Elevated PSA 3/14/2012    Hypercholesterolemia     Hypertension     [facility]ipheral vascular [facility]     Stroke ([facility]     Thromboembolus [facility])      Patient Active Problem List   Diagnosis Code    Elevated PSA R97.20    History of CVA (cerebrovascular accident) Z80.78    History of DVT (deep vein thrombosis) Z86.718    Bruit of left carotid artery R09.89    Pain in both lower extremities M79.604, M79.605     Past Surgical History:   Procedure Laterality Date    HX COLONOSCOPY      HX PROSTATECTOMY      HX UROLOGICAL      IR STENT PLACEMENT  2002    VASCULAR SURGERY PROCEDURE UNLIST       Current Outpatient Medications   Medication Sig Dispense Refill    amLODIPine (NORVASC) 10 mg tablet Take  by mouth daily.       atorvastatin (LIPITOR) 20 mg tablet Take  by mouth daily.  losartan (COZAAR) 100 mg tablet Take 100 mg by mouth daily.  hydroCHLOROthiazide (MICROZIDE) 12.5 mg capsule Take 12.5 mg by mouth daily.  warfarin (COUMADIN) 7.5 mg tablet Take 7.5 mg by mouth daily.  warfarin (COUMADIN) 5 mg tablet Take 5 mg by mouth daily.  aspirin 81 mg chewable tablet Take 81 mg by mouth daily.  phenytoin ER (DILANTIN EXTENDED) 100 mg ER capsule Take  by mouth. Allergies   Allergen Reactions    Fentanyl Unknown (comments)    Morphine Itching    Penicillins Unknown (comments)       Physical   Visit Vitals  /80 (BP 1 Location: Left arm, BP Patient Position: Sitting)   Resp 17   Ht 5' 6\" (1.676 m)   Wt 182 lb (82.6 kg)   BMI 29.38 kg/m²       He walked in today without an assistant  Head is normocephalic  Neck no JVD  Chest no resp difficulty  Abdomen soft nontender  No significant BLE edema on exam today. Impression/Plan:   67-year-old male with history of bilateral lower extremity DVTs as well as chronically occluded IVC filter. Today he presents after venogram and successful stenting within the inferior vena cava filter. He does state that his right leg pain is improved. He does still have some intermittent shooting pains and states that the leg will \"jump\" on occasion. I discussed that I feel his symptoms are more likely related to his stroke and are likely nerve related. We will have him back in 1 month's time with ultrasounds to evaluate his stent. He is certainly understanding to call the office sooner as needed. He will continue to elevate the legs and use gentle compression stockings for any swelling that he may develop. Both patient and his son express understanding and agreed to the plan. [address] [place], [place]    PLEASE NOTE:  This document has been produced using voice recognition software. Unrecognized errors in transcription may be present.

## 2019-10-23 ENCOUNTER — OFFICE VISIT (OUTPATIENT)
Dept: VASCULAR SURGERY | Age: 79
End: 2019-10-23

## 2019-10-23 VITALS
HEART RATE: 68 BPM | WEIGHT: 182 LBS | BODY MASS INDEX: 29.25 KG/M2 | HEIGHT: 66 IN | RESPIRATION RATE: 18 BRPM | DIASTOLIC BLOOD PRESSURE: 76 MMHG | SYSTOLIC BLOOD PRESSURE: 138 MMHG

## 2019-10-23 DIAGNOSIS — M79.604 PAIN IN BOTH LOWER EXTREMITIES: ICD-10-CM

## 2019-10-23 DIAGNOSIS — M79.605 PAIN IN BOTH LOWER EXTREMITIES: ICD-10-CM

## 2019-10-23 DIAGNOSIS — Z86.718 HISTORY OF DVT (DEEP VEIN THROMBOSIS): Primary | ICD-10-CM

## 2019-10-23 RX ORDER — OMEPRAZOLE 20 MG/1
20 CAPSULE, DELAYED RELEASE ORAL DAILY
COMMUNITY

## 2019-10-23 NOTE — PROGRESS NOTES
Yuri Garcia    Chief Complaint   Patient presents with    Leg Pain       History and Physical    Yuri Garcia is a [de-identified] y.o. male who had chronically occluded IVC filter and now fully repaired with stent. Overall patient seems to be doing quite well without any swelling or discomfort within his lower extremities. Previous pain within his lower extremities has since resolved. His only complaint today is a stiff neck on the left side. No fevers or chills or shortness of breath. Past Medical History:   Diagnosis Date    Burning with urination     Elevated PSA 3/14/2012    Hypercholesterolemia     Hypertension     Peripheral vascular disease ([address]     Stroke (HCC)     Thromboembolus ([address]      Past Surgical History:   Procedure Laterality Date    HX COLONOSCOPY      HX PROSTATECTOMY      HX UROLOGICAL      IR STENT PLACEMENT  2002    VASCULAR SURGERY PROCEDURE UNLIST       Patient Active Problem List   Diagnosis Code    Elevated PSA R97.20    History of CVA (cerebrovascular accident) Z80.78    History of DVT (deep vein thrombosis) Z86.718    Bruit of left carotid artery R09.89    Pain in both lower extremities M79.604, M79.605     Current Outpatient Medications   Medication Sig Dispense Refill    omeprazole (PRILOSEC) 20 mg capsule Take 20 mg by mouth daily.  amLODIPine (NORVASC) 10 mg tablet Take  by mouth daily.  atorvastatin (LIPITOR) 20 mg tablet Take  by mouth daily.  losartan (COZAAR) 100 mg tablet Take 100 mg by mouth daily.  hydroCHLOROthiazide (MICROZIDE) 12.5 mg capsule Take 12.5 mg by mouth daily.  warfarin (COUMADIN) 7.5 mg tablet Take 7.5 mg by mouth daily.  warfarin (COUMADIN) 5 mg tablet Take 5 mg by mouth daily.  aspirin 81 mg chewable tablet Take 81 mg by mouth daily.  phenytoin ER (DILANTIN EXTENDED) 100 mg ER capsule Take  by mouth.          Allergies   Allergen Reactions    Fentanyl Unknown (comments)    Morphine Itching    Penicillins Unknown (comments)     Social History     Socioeconomic History    Marital status:      Spouse name: Not on file    Number of children: Not on file    Years of education: Not on file    Highest education level: Not on file   Occupational History    Not on file   Social Needs    Financial resource strain: Not on file    Food insecurity:     Worry: Not on file     Inability: Not on file    Transportation needs:     Medical: Not on file     Non-medical: Not on file   Tobacco Use    Smoking status: Former Smoker     Types: Cigarettes     Last attempt to quit: 1980     Years since quittin.7    Smokeless tobacco: Never Used   Substance and Sexual Activity    Alcohol use: No    Drug use: No    Sexual activity: Not Currently   Lifestyle    Physical activity:     Days per week: Not on file     Minutes per session: Not on file    Stress: Not on file   Relationships    Social connections:     Talks on phone: Not on file     Gets together: Not on file     Attends [employer] service: Not on file     Active member of club or organization: Not on file     Attends meetings of clubs or organizations: Not on file     Relationship status: Not on file    Intimate partner violence:     Fear of current or ex partner: Not on file     Emotionally abused: Not on file     Physically abused: Not on file     Forced sexual activity: Not on file   Other Topics Concern    Not on file   Social History Narrative    Not on file      History reviewed. No pertinent family history.     Physical Exam:    Visit Vitals  /76 (BP 1 Location: Left arm, BP Patient Position: Sitting)   Pulse 68   Resp 18   Ht 5' 6\" (1.676 m)   Wt 182 lb (82.6 kg)   BMI 29.38 kg/m²      General: Well-appearing male in no acute distress  HEENT: EOMI no scleral icterus is noted  Pulmonary: No increased work of breathing is noted  Extremities: Warm and perfused with no edema or ulcerations  Neuro: Cranial nerves II through XII are grossly intact patient does have weakness of the right side of his body from previous stroke    Impression and Plan:  Constanza Bowman is a [de-identified] y.o. male with occluded IVC filter now fully repaired with IVC stent. Patient seems to be doing quite well and has had full resolution of all lower extremity issues. I will have him come back in 5 months for his 6-month visit with a repeat ultrasound. At that point in time we will then likely be able to get another one at 6 months for his anniversary and then follow-up any other ultrasounds that may be necessary at that time. We reviewed the plan with the patient and the patient understands. We also gave the patient appropriate instructions on their disease process and when to call back. Greater than 50% of this visit was spent with face to face discussion. Yanci Mcguire MD    PLEASE NOTE:  This document has been produced using voice recognition software. Unrecognized errors in transcription may be present.

## 2019-12-10 ENCOUNTER — TELEPHONE (OUTPATIENT)
Dept: VASCULAR SURGERY | Age: 79
End: 2019-12-10

## 2019-12-10 NOTE — TELEPHONE ENCOUNTER
----- Message from Dixie Devlin RN sent at 12/10/2019  2:47 PM EST -----  Regarding: FW: Pain Medication    ----- Message -----  From: Sylvia Dsouza RN  Sent: 12/9/2019  12:06 PM EST  To: Hermilo Roldan  Subject: FW: Pain Medication                              Can you please schedule patient to see Dr. Julianna Young, let him know that he has to be seen before pain meds can be given. Mac has opening next Monday   ----- Message -----  [facility]  Sent: 12/9/2019  11:28 AM EST  To: Dixie Devlin RN  Subject: RE: Pain Medication                              No pain medicine. Don't see any reason to up his ultrasound. This is likely secondary to his neuropathy. If you bring him in he should see Dr Julianna Young. Thanks    ----- Message -----  From: Sylvia Dsouza RN  Sent: 12/9/2019  11:12 AM EST  To: MARIE Seals  Subject: FW: Pain Medication                              Want to move up us or  bring in to see first ?  ----- Message -----  From: Lucy Malloy: 12/9/2019  10:30 AM EST  To: [facility] Nurse Pool  Subject: Pain Medication                                  Patient called, he wanted pain medication for his right ankle. He had surgery in August.  Please call him. I told him he may have to come in for a visit.

## 2019-12-10 NOTE — TELEPHONE ENCOUNTER
Per provider patient needs to come in to discuss options with dr. Luann Burgos, sent message to upfront to schedule with dr. Luann Burgos.

## 2019-12-16 ENCOUNTER — OFFICE VISIT (OUTPATIENT)
Dept: VASCULAR SURGERY | Age: 79
End: 2019-12-16

## 2019-12-16 VITALS
HEIGHT: 66 IN | WEIGHT: 182 LBS | DIASTOLIC BLOOD PRESSURE: 74 MMHG | SYSTOLIC BLOOD PRESSURE: 150 MMHG | BODY MASS INDEX: 29.25 KG/M2 | RESPIRATION RATE: 17 BRPM

## 2019-12-16 DIAGNOSIS — I73.9 PAD (PERIPHERAL ARTERY DISEASE) (HCC): ICD-10-CM

## 2019-12-16 DIAGNOSIS — I70.213 ATHEROSCLEROSIS OF NATIVE ARTERY OF BOTH LOWER EXTREMITIES WITH INTERMITTENT CLAUDICATION (HCC): Primary | ICD-10-CM

## 2019-12-16 DIAGNOSIS — Z86.718 HISTORY OF DVT (DEEP VEIN THROMBOSIS): ICD-10-CM

## 2019-12-16 DIAGNOSIS — I87.1 IVC OBSTRUCTION: ICD-10-CM

## 2019-12-16 NOTE — PROGRESS NOTES
Yuri Garcia    Chief Complaint   Patient presents with    Leg Pain       History and Physical    Yuri Garcia is a [de-identified] y.o. male who is now complaining of more claudication of the right lower extremity. He had originally been doing a lot better after IVC filter stenting which improved his venous claudication. At the time he was doing just fine. Now he seems to be dealing with more arterial in nature. He does have an ultrasound previously showing some femoral-popliteal disease on that side but it seems that he has had a change in his symptoms. He went from not having any problems and now increased issues. No fevers or chills or ulcerations. No rest pain. Past Medical History:   Diagnosis Date    Burning with urination     Elevated PSA 3/14/2012    Hypercholesterolemia     Hypertension     Peripheral vascular disease (HCC)     Stroke ([facility])     [facility]embolus [facility] [address]      Past Surgical History:   Procedure Laterality Date    HX COLONOSCOPY      HX PROSTATECTOMY      HX UROLOGICAL      IR STENT PLACEMENT  2002    VASCULAR SURGERY PROCEDURE UNLIST       Patient Active Problem List   Diagnosis Code    Elevated PSA R97.20    History of CVA (cerebrovascular accident) Z80.78    History of DVT (deep vein thrombosis) Z86.718    Bruit of left carotid artery R09.89    Pain in both lower extremities M79.604, M79.605    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  amLODIPine (NORVASC) 10 mg tablet Take  by mouth daily.  atorvastatin (LIPITOR) 20 mg tablet Take  by mouth daily.  losartan (COZAAR) 100 mg tablet Take 100 mg by mouth daily.  hydroCHLOROthiazide (MICROZIDE) 12.5 mg capsule Take 12.5 mg by mouth daily.       warfarin (COUMADIN) 7.5 mg tablet Take 7.5 mg by mouth daily.        warfarin (COUMADIN) 5 mg tablet Take 5 mg by mouth daily.  aspirin 81 mg chewable tablet Take 81 mg by mouth daily.  phenytoin ER (DILANTIN EXTENDED) 100 mg ER capsule Take  by mouth. Allergies   Allergen Reactions    Fentanyl Unknown (comments)    Morphine Itching    Penicillins Unknown (comments)     Social History     Socioeconomic History    Marital status:      Spouse name: Not on file    Number of children: Not on file    Years of education: Not on file    Highest education level: Not on file   Occupational History    Not on file   Social Needs    Financial resource strain: Not on file    Food insecurity:     Worry: Not on file     Inability: Not on file    Transportation needs:     Medical: Not on file     Non-medical: Not on file   Tobacco Use    Smoking status: Former Smoker     Types: Cigarettes     Last attempt to quit: 1980     Years since quittin.8    Smokeless tobacco: Never Used   Substance and Sexual Activity    Alcohol use: No    Drug use: No    Sexual activity: Not Currently   Lifestyle    Physical activity:     Days per week: Not on file     Minutes per session: Not on file    Stress: Not on file   Relationships    Social connections:     Talks on phone: Not on file     Gets together: Not on file     Attends Gnosticist service: Not on file     Active member of club or organization: Not on file     Attends meetings of clubs or organizations: Not on file     Relationship status: Not on file    Intimate partner violence:     Fear of current or ex partner: Not on file     Emotionally abused: Not on file     Physically abused: Not on file     Forced sexual activity: Not on file   Other Topics Concern    Not on file   Social History Narrative    Not on file      History reviewed. No pertinent family history.     Physical Exam:    Visit Vitals  /74 (BP 1 Location: Left arm, BP Patient Position: Sitting)   Resp 17   Ht 5' 6\" (1.676 m)   Wt 182 lb (82.6 kg)   BMI 29.38 kg/m²      General: Well-appearing male in no acute distress  HEENT: EOMI no scleral icterus is noted  Pulmonary: No chest work of breathing is noted  Extremities: Warm and perfused no edema in today's visit but he does have some skin changes with purplish discoloration of his lower extremities. No ulcerations identified at today's exam  Neuro: Cranial nerves II through XII are grossly intact    Impression and Plan:  Dave Glass is a [de-identified] y.o. male with class IV a chronic venous insufficiency of the right lower extremity with previously known occluded inferior vena cava now repaired with IVC filter. Given the fact that his pain is come back at 1 to make sure that were not missing anything. I will repeat his arterial ultrasound as well as a venous ultrasound to make sure that his venous stent is working appropriately. I bring him back next week so that we can determine if we need to move forward with angiography with the patient. More than likely this is what is going to be the need for him but I need to make sure that were not missing any venous stent issue. Also to make sure that there is no worsening of his ultrasound on the right lower extremity given the new findings and clinical issues that he is experiencing back from August.  Further plan pending ultrasounds    We reviewed the plan with the patient and the patient understands. We also gave the patient appropriate instructions on their disease process and when to call back. Greater than 50% of this visit was spent with face to face discussion. Sharda Bateman MD    PLEASE NOTE:  This document has been produced using voice recognition software. Unrecognized errors in transcription may be present.

## 2019-12-16 NOTE — PROGRESS NOTES
Order placed for bilateral leg arterial with yoshi and IVC complete US per verbal order from Dr. Han Arreola, order needed to be changed due to patient would rather have testing closer to home.

## 2019-12-18 ENCOUNTER — DOCUMENTATION ONLY (OUTPATIENT)
Dept: VASCULAR SURGERY | Age: 79
End: 2019-12-18

## 2019-12-18 NOTE — PROGRESS NOTES
Patient called and stated that someone called about his appointments at [facility] for studies on 12.19.19.  Patient is confirming appointments and is aware that he needs to check in at 830am.

## 2019-12-19 ENCOUNTER — HOSPITAL ENCOUNTER (OUTPATIENT)
Dept: VASCULAR SURGERY | Age: 79
Discharge: HOME | End: 2019-12-19
Attending: SURGERY
Payer: MEDICARE

## 2019-12-19 DIAGNOSIS — Z86.718 HISTORY OF DVT (DEEP VEIN THROMBOSIS): ICD-10-CM

## 2019-12-19 DIAGNOSIS — I73.9 PAD (PERIPHERAL ARTERY DISEASE) (HCC): ICD-10-CM

## 2019-12-19 DIAGNOSIS — M79.605 PAIN IN BOTH LOWER EXTREMITIES: ICD-10-CM

## 2019-12-19 DIAGNOSIS — I70.213 ATHEROSCLEROSIS OF NATIVE ARTERY OF BOTH LOWER EXTREMITIES WITH INTERMITTENT CLAUDICATION (HCC): ICD-10-CM

## 2019-12-19 DIAGNOSIS — I87.1 IVC OBSTRUCTION: ICD-10-CM

## 2019-12-19 DIAGNOSIS — M79.604 PAIN IN BOTH LOWER EXTREMITIES: ICD-10-CM

## 2019-12-19 PROCEDURE — 93923 UPR/LXTR ART STDY 3+ LVLS: CPT

## 2019-12-19 PROCEDURE — 93978 VASCULAR STUDY: CPT

## 2019-12-20 LAB
LEFT ABI: 0.76
LEFT ANTERIOR TIBIAL: 111 MMHG
LEFT ARM BP: 134 MMHG
LEFT POSTERIOR TIBIAL: 111 MMHG
LEFT TBI: 0.88
LEFT TOE PRESSURE: 129 MMHG
RIGHT ABI: 0.76
RIGHT ANTERIOR TIBIAL: 111 MMHG
RIGHT ARM BP: 147 MMHG
RIGHT POSTERIOR TIBIAL: 107 MMHG
RIGHT TBI: 0.65
RIGHT TOE PRESSURE: 96 MMHG

## 2020-01-15 ENCOUNTER — OFFICE VISIT (OUTPATIENT)
Dept: VASCULAR SURGERY | Age: 80
End: 2020-01-15

## 2020-01-15 VITALS
SYSTOLIC BLOOD PRESSURE: 146 MMHG | WEIGHT: 182 LBS | DIASTOLIC BLOOD PRESSURE: 70 MMHG | OXYGEN SATURATION: 94 % | BODY MASS INDEX: 29.25 KG/M2 | RESPIRATION RATE: 18 BRPM | HEIGHT: 66 IN | HEART RATE: 78 BPM

## 2020-01-15 DIAGNOSIS — I73.9 PAD (PERIPHERAL ARTERY DISEASE) (HCC): ICD-10-CM

## 2020-01-15 DIAGNOSIS — I73.9 CLAUDICATION OF RIGHT LOWER EXTREMITY (HCC): Primary | ICD-10-CM

## 2020-01-15 DIAGNOSIS — I87.1 IVC OBSTRUCTION: ICD-10-CM

## 2020-01-15 DIAGNOSIS — Z86.73 HISTORY OF CVA (CEREBROVASCULAR ACCIDENT): ICD-10-CM

## 2020-01-15 DIAGNOSIS — Z86.718 HISTORY OF DVT (DEEP VEIN THROMBOSIS): ICD-10-CM

## 2020-01-15 NOTE — PROGRESS NOTES
Ellis Bello    Chief Complaint   Patient presents with    Leg Pain       History and Physical    Ellis Bello is a [de-identified] y.o. male with class IV a chronic venous insufficiency of bilateral lower extremities as well as peripheral arterial disease. Patient did have stenting of occluded inferior vena cava with massive improvement of venous claudication now to the point where he has arterial claudication worse on the right than the left. No rest pain or ulcerations. Patient does have previous stroke which involved the right side of his body. No current fevers or chills. Although patient states that it is very difficult for him to see me given the fact that he lives on the [facility]. Past Medical History:   Diagnosis Date    Burning with urination     Elevated PSA 3/14/2012    Hypercholesterolemia     Hypertension     Peripheral vascular disease (HCC)     Stroke ([facility])     Thromboembolus ([facility] [address]      Past Surgical History:   Procedure Laterality Date    HX COLONOSCOPY      HX PROSTATECTOMY      HX UROLOGICAL      IR STENT PLACEMENT  2002    VASCULAR SURGERY PROCEDURE UNLIST       Patient Active Problem List   Diagnosis Code    Elevated PSA R97.20    History of CVA (cerebrovascular accident) Z80.78    History of DVT (deep vein thrombosis) Z86.718    Bruit of left carotid artery R09.89    Pain in both lower extremities M79.604, M79.605    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 90 Cap 3    omeprazole (PRILOSEC) 20 mg capsule Take 20 mg by mouth daily.  amLODIPine (NORVASC) 10 mg tablet Take  by mouth daily.  atorvastatin (LIPITOR) 20 mg tablet Take  by mouth daily.  losartan (COZAAR) 100 mg tablet Take 100 mg by mouth daily.       hydroCHLOROthiazide (MICROZIDE) 12.5 mg capsule Take 12.5 mg by mouth daily.  warfarin (COUMADIN) 7.5 mg tablet Take 7.5 mg by mouth daily.  warfarin (COUMADIN) 5 mg tablet Take 5 mg by mouth daily.  aspirin 81 mg chewable tablet Take 81 mg by mouth daily.  phenytoin ER (DILANTIN EXTENDED) 100 mg ER capsule Take  by mouth. Allergies   Allergen Reactions    Fentanyl Unknown (comments)    Morphine Itching    Penicillins Unknown (comments)     Social History     Socioeconomic History    Marital status:      Spouse name: Not on file    Number of children: Not on file    Years of education: Not on file    Highest education level: Not on file   Occupational History    Not on file   Social Needs    Financial resource strain: Not on file    Food insecurity:     Worry: Not on file     Inability: Not on file    Transportation needs:     Medical: Not on file     Non-medical: Not on file   Tobacco Use    Smoking status: Former Smoker     Types: Cigarettes     Last attempt to quit: 1980     Years since quittin.9    Smokeless tobacco: Never Used   Substance and Sexual Activity    Alcohol use: No    Drug use: No    Sexual activity: Not Currently   Lifestyle    Physical activity:     Days per week: Not on file     Minutes per session: Not on file    Stress: Not on file   Relationships    Social connections:     Talks on phone: Not on file     Gets together: Not on file     Attends [employer] service: Not on file     Active member of club or organization: Not on file     Attends meetings of clubs or organizations: Not on file     Relationship status: Not on file    Intimate partner violence:     Fear of current or ex partner: Not on file     Emotionally abused: Not on file     Physically abused: Not on file     Forced sexual activity: Not on file   Other Topics Concern    Not on file   Social History Narrative    Not on file      History reviewed.  No pertinent family history. Physical Exam:    Visit Vitals  /70 (BP 1 Location: Left arm, BP Patient Position: Sitting)   Pulse 78   Resp 18   Ht 5' 6\" (1.676 m)   Wt 182 lb (82.6 kg)   SpO2 94%   BMI 29.38 kg/m²      General: Well-appearing male without any acute distress  HEENT: EOMI no scleral icterus is noted  Pulmonary: No increased work of breathing is noted  Extremities: Warm and perfused he has minimal edema of bilateral lower extremities with some hemosiderin deposits noted within the medial malleolus bilaterally no ulcerations are identified bilaterally nonpalpable distal pulses noted bilaterally  Neuro: Cranial nerves II through XII are grossly intact normal mentation but he does have start and stop speech at times    Impression and Plan:  Gildardo Castillo is a [de-identified] y.o. male with moderate arterial disease within bilateral lower extremities based on his ultrasound today he does have good blood flow going through his inferior vena cava and iliac venous systems based on ultrasounds. I have recommended moving forward with angiography the right lower extremity to help with the problems that he is having on the right leg. Although given the fact that we are so far away patient wants to continue with best medical therapy and exercise. I told him that this is perfectly reasonable. He can follow-up with me next year with repeat ultrasounds or he can find himself a vascular surgeon that is on the [place] closer to him. We reviewed the plan with the patient and the patient understands. We also gave the patient appropriate instructions on their disease process and when to call back. Greater than 50% of this visit was spent with face to face discussion. Karen Enriquez MD    PLEASE NOTE:  This document has been produced using voice recognition software. Unrecognized errors in transcription may be present.

## 2020-03-04 ENCOUNTER — DOCUMENTATION ONLY (OUTPATIENT)
Dept: VASCULAR SURGERY | Age: 80
End: 2020-03-04

## 2020-03-04 NOTE — PROGRESS NOTES
Cancel IVC study per Martinez. Patient seen this year in office and instructed to follow up in 1 yr.

## 2021-08-03 PROBLEM — I73.9 PAD (PERIPHERAL ARTERY DISEASE) (HCC): Status: RESOLVED | Noted: 2019-12-16 | Resolved: 2021-08-03
